# Patient Record
Sex: FEMALE | Race: BLACK OR AFRICAN AMERICAN | NOT HISPANIC OR LATINO | URBAN - METROPOLITAN AREA
[De-identification: names, ages, dates, MRNs, and addresses within clinical notes are randomized per-mention and may not be internally consistent; named-entity substitution may affect disease eponyms.]

---

## 2019-11-13 ENCOUNTER — INPATIENT (INPATIENT)
Facility: HOSPITAL | Age: 79
LOS: 7 days | Discharge: HOME | DRG: 871 | End: 2019-11-21
Attending: INTERNAL MEDICINE | Admitting: INTERNAL MEDICINE
Payer: MEDICARE

## 2019-11-13 VITALS
HEART RATE: 118 BPM | DIASTOLIC BLOOD PRESSURE: 73 MMHG | SYSTOLIC BLOOD PRESSURE: 124 MMHG | TEMPERATURE: 99 F | OXYGEN SATURATION: 99 % | RESPIRATION RATE: 18 BRPM

## 2019-11-13 DIAGNOSIS — K92.1 MELENA: ICD-10-CM

## 2019-11-13 DIAGNOSIS — K57.92 DIVERTICULITIS OF INTESTINE, PART UNSPECIFIED, WITHOUT PERFORATION OR ABSCESS WITHOUT BLEEDING: ICD-10-CM

## 2019-11-13 DIAGNOSIS — F01.50 VASCULAR DEMENTIA WITHOUT BEHAVIORAL DISTURBANCE: ICD-10-CM

## 2019-11-13 DIAGNOSIS — R63.8 OTHER SYMPTOMS AND SIGNS CONCERNING FOOD AND FLUID INTAKE: ICD-10-CM

## 2019-11-13 DIAGNOSIS — K21.9 GASTRO-ESOPHAGEAL REFLUX DISEASE WITHOUT ESOPHAGITIS: ICD-10-CM

## 2019-11-13 DIAGNOSIS — R74.0 NONSPECIFIC ELEVATION OF LEVELS OF TRANSAMINASE AND LACTIC ACID DEHYDROGENASE [LDH]: ICD-10-CM

## 2019-11-13 DIAGNOSIS — E78.5 HYPERLIPIDEMIA, UNSPECIFIED: ICD-10-CM

## 2019-11-13 DIAGNOSIS — Z91.89 OTHER SPECIFIED PERSONAL RISK FACTORS, NOT ELSEWHERE CLASSIFIED: ICD-10-CM

## 2019-11-13 DIAGNOSIS — I42.9 CARDIOMYOPATHY, UNSPECIFIED: ICD-10-CM

## 2019-11-13 DIAGNOSIS — R79.89 OTHER SPECIFIED ABNORMAL FINDINGS OF BLOOD CHEMISTRY: ICD-10-CM

## 2019-11-13 LAB
ACANTHOCYTES BLD QL SMEAR: SLIGHT — SIGNIFICANT CHANGE UP
ALBUMIN SERPL ELPH-MCNC: 3.8 G/DL — SIGNIFICANT CHANGE UP (ref 3.3–5)
ALP SERPL-CCNC: 88 U/L — SIGNIFICANT CHANGE UP (ref 40–120)
ALT FLD-CCNC: 70 U/L — HIGH (ref 10–45)
ANION GAP SERPL CALC-SCNC: 13 MMOL/L — SIGNIFICANT CHANGE UP (ref 5–17)
ANION GAP SERPL CALC-SCNC: 14 MMOL/L — SIGNIFICANT CHANGE UP (ref 5–17)
APTT BLD: 25.6 SEC — LOW (ref 27.5–36.3)
AST SERPL-CCNC: 115 U/L — HIGH (ref 10–40)
BASOPHILS # BLD AUTO: 0 K/UL — SIGNIFICANT CHANGE UP (ref 0–0.2)
BASOPHILS NFR BLD AUTO: 0 % — SIGNIFICANT CHANGE UP (ref 0–2)
BILIRUB SERPL-MCNC: 0.7 MG/DL — SIGNIFICANT CHANGE UP (ref 0.2–1.2)
BLD GP AB SCN SERPL QL: NEGATIVE — SIGNIFICANT CHANGE UP
BUN SERPL-MCNC: 22 MG/DL — SIGNIFICANT CHANGE UP (ref 7–23)
BUN SERPL-MCNC: 25 MG/DL — HIGH (ref 7–23)
BURR CELLS BLD QL SMEAR: PRESENT — SIGNIFICANT CHANGE UP
CALCIUM SERPL-MCNC: 8 MG/DL — LOW (ref 8.4–10.5)
CALCIUM SERPL-MCNC: 9.4 MG/DL — SIGNIFICANT CHANGE UP (ref 8.4–10.5)
CHLORIDE SERPL-SCNC: 100 MMOL/L — SIGNIFICANT CHANGE UP (ref 96–108)
CHLORIDE SERPL-SCNC: 104 MMOL/L — SIGNIFICANT CHANGE UP (ref 96–108)
CHOLEST SERPL-MCNC: 137 MG/DL — SIGNIFICANT CHANGE UP (ref 10–199)
CO2 SERPL-SCNC: 18 MMOL/L — LOW (ref 22–31)
CO2 SERPL-SCNC: 21 MMOL/L — LOW (ref 22–31)
CREAT SERPL-MCNC: 2.39 MG/DL — HIGH (ref 0.5–1.3)
CREAT SERPL-MCNC: 2.64 MG/DL — HIGH (ref 0.5–1.3)
EOSINOPHIL # BLD AUTO: 0 K/UL — SIGNIFICANT CHANGE UP (ref 0–0.5)
EOSINOPHIL NFR BLD AUTO: 0 % — SIGNIFICANT CHANGE UP (ref 0–6)
GIANT PLATELETS BLD QL SMEAR: PRESENT — SIGNIFICANT CHANGE UP
GLUCOSE SERPL-MCNC: 206 MG/DL — HIGH (ref 70–99)
GLUCOSE SERPL-MCNC: 300 MG/DL — HIGH (ref 70–99)
HAV IGM SER-ACNC: SIGNIFICANT CHANGE UP
HBV CORE IGM SER-ACNC: SIGNIFICANT CHANGE UP
HBV SURFACE AG SER-ACNC: SIGNIFICANT CHANGE UP
HCT VFR BLD CALC: 36.5 % — SIGNIFICANT CHANGE UP (ref 34.5–45)
HCT VFR BLD CALC: 41.8 % — SIGNIFICANT CHANGE UP (ref 34.5–45)
HCV AB S/CO SERPL IA: 0.12 S/CO — SIGNIFICANT CHANGE UP
HCV AB SERPL-IMP: SIGNIFICANT CHANGE UP
HDLC SERPL-MCNC: 67 MG/DL — SIGNIFICANT CHANGE UP
HGB BLD-MCNC: 12 G/DL — SIGNIFICANT CHANGE UP (ref 11.5–15.5)
HGB BLD-MCNC: 13.6 G/DL — SIGNIFICANT CHANGE UP (ref 11.5–15.5)
INR BLD: 1.1 — SIGNIFICANT CHANGE UP (ref 0.88–1.16)
LACTATE SERPL-SCNC: 1.8 MMOL/L — SIGNIFICANT CHANGE UP (ref 0.5–2)
LIPID PNL WITH DIRECT LDL SERPL: 49 MG/DL — SIGNIFICANT CHANGE UP
LYMPHOCYTES # BLD AUTO: 0.29 K/UL — LOW (ref 1–3.3)
LYMPHOCYTES # BLD AUTO: 2.6 % — LOW (ref 13–44)
MAGNESIUM SERPL-MCNC: 1.8 MG/DL — SIGNIFICANT CHANGE UP (ref 1.6–2.6)
MANUAL SMEAR VERIFICATION: SIGNIFICANT CHANGE UP
MCHC RBC-ENTMCNC: 28.4 PG — SIGNIFICANT CHANGE UP (ref 27–34)
MCHC RBC-ENTMCNC: 28.6 PG — SIGNIFICANT CHANGE UP (ref 27–34)
MCHC RBC-ENTMCNC: 32.5 GM/DL — SIGNIFICANT CHANGE UP (ref 32–36)
MCHC RBC-ENTMCNC: 32.9 GM/DL — SIGNIFICANT CHANGE UP (ref 32–36)
MCV RBC AUTO: 86.9 FL — SIGNIFICANT CHANGE UP (ref 80–100)
MCV RBC AUTO: 87.3 FL — SIGNIFICANT CHANGE UP (ref 80–100)
MONOCYTES # BLD AUTO: 2.04 K/UL — HIGH (ref 0–0.9)
MONOCYTES NFR BLD AUTO: 18.4 % — HIGH (ref 2–14)
NEUTROPHILS # BLD AUTO: 8.66 K/UL — HIGH (ref 1.8–7.4)
NEUTROPHILS NFR BLD AUTO: 60.5 % — SIGNIFICANT CHANGE UP (ref 43–77)
NEUTS BAND # BLD: 17.6 % — HIGH (ref 0–8)
NRBC # BLD: 0 /100 WBCS — SIGNIFICANT CHANGE UP (ref 0–0)
OVALOCYTES BLD QL SMEAR: SLIGHT — SIGNIFICANT CHANGE UP
PLAT MORPH BLD: ABNORMAL
PLATELET # BLD AUTO: 189 K/UL — SIGNIFICANT CHANGE UP (ref 150–400)
PLATELET # BLD AUTO: 224 K/UL — SIGNIFICANT CHANGE UP (ref 150–400)
POTASSIUM SERPL-MCNC: 3.1 MMOL/L — LOW (ref 3.5–5.3)
POTASSIUM SERPL-MCNC: 3.3 MMOL/L — LOW (ref 3.5–5.3)
POTASSIUM SERPL-SCNC: 3.1 MMOL/L — LOW (ref 3.5–5.3)
POTASSIUM SERPL-SCNC: 3.3 MMOL/L — LOW (ref 3.5–5.3)
PROT SERPL-MCNC: 8.2 G/DL — SIGNIFICANT CHANGE UP (ref 6–8.3)
PROTHROM AB SERPL-ACNC: 12.5 SEC — SIGNIFICANT CHANGE UP (ref 10–12.9)
RBC # BLD: 4.2 M/UL — SIGNIFICANT CHANGE UP (ref 3.8–5.2)
RBC # BLD: 4.79 M/UL — SIGNIFICANT CHANGE UP (ref 3.8–5.2)
RBC # FLD: 13.6 % — SIGNIFICANT CHANGE UP (ref 10.3–14.5)
RBC # FLD: 13.7 % — SIGNIFICANT CHANGE UP (ref 10.3–14.5)
RBC BLD AUTO: ABNORMAL
RH IG SCN BLD-IMP: POSITIVE — SIGNIFICANT CHANGE UP
RH IG SCN BLD-IMP: POSITIVE — SIGNIFICANT CHANGE UP
SALICYLATES SERPL-MCNC: <0.3 MG/DL — LOW (ref 2.8–20)
SODIUM SERPL-SCNC: 135 MMOL/L — SIGNIFICANT CHANGE UP (ref 135–145)
SODIUM SERPL-SCNC: 135 MMOL/L — SIGNIFICANT CHANGE UP (ref 135–145)
TOTAL CHOLESTEROL/HDL RATIO MEASUREMENT: 2 RATIO — LOW (ref 3.3–7.1)
TRIGL SERPL-MCNC: 107 MG/DL — SIGNIFICANT CHANGE UP (ref 10–149)
VARIANT LYMPHS # BLD: 0.9 % — SIGNIFICANT CHANGE UP (ref 0–6)
WBC # BLD: 11.09 K/UL — HIGH (ref 3.8–10.5)
WBC # BLD: 8.86 K/UL — SIGNIFICANT CHANGE UP (ref 3.8–10.5)
WBC # FLD AUTO: 11.09 K/UL — HIGH (ref 3.8–10.5)
WBC # FLD AUTO: 8.86 K/UL — SIGNIFICANT CHANGE UP (ref 3.8–10.5)

## 2019-11-13 PROCEDURE — 70498 CT ANGIOGRAPHY NECK: CPT | Mod: 26

## 2019-11-13 PROCEDURE — 99222 1ST HOSP IP/OBS MODERATE 55: CPT

## 2019-11-13 PROCEDURE — 99283 EMERGENCY DEPT VISIT LOW MDM: CPT

## 2019-11-13 PROCEDURE — 99291 CRITICAL CARE FIRST HOUR: CPT

## 2019-11-13 PROCEDURE — 70496 CT ANGIOGRAPHY HEAD: CPT | Mod: 26

## 2019-11-13 PROCEDURE — 70450 CT HEAD/BRAIN W/O DYE: CPT | Mod: 26,59

## 2019-11-13 PROCEDURE — 74176 CT ABD & PELVIS W/O CONTRAST: CPT | Mod: 26

## 2019-11-13 PROCEDURE — 93010 ELECTROCARDIOGRAM REPORT: CPT

## 2019-11-13 PROCEDURE — 0042T: CPT

## 2019-11-13 PROCEDURE — 71045 X-RAY EXAM CHEST 1 VIEW: CPT | Mod: 26

## 2019-11-13 PROCEDURE — 99223 1ST HOSP IP/OBS HIGH 75: CPT | Mod: GC

## 2019-11-13 RX ORDER — CEFTRIAXONE 500 MG/1
1000 INJECTION, POWDER, FOR SOLUTION INTRAMUSCULAR; INTRAVENOUS ONCE
Refills: 0 | Status: COMPLETED | OUTPATIENT
Start: 2019-11-13 | End: 2019-11-13

## 2019-11-13 RX ORDER — ONDANSETRON 8 MG/1
4 TABLET, FILM COATED ORAL ONCE
Refills: 0 | Status: COMPLETED | OUTPATIENT
Start: 2019-11-13 | End: 2019-11-13

## 2019-11-13 RX ORDER — IOHEXOL 300 MG/ML
30 INJECTION, SOLUTION INTRAVENOUS ONCE
Refills: 0 | Status: COMPLETED | OUTPATIENT
Start: 2019-11-13 | End: 2019-11-13

## 2019-11-13 RX ORDER — CEFTRIAXONE 500 MG/1
1000 INJECTION, POWDER, FOR SOLUTION INTRAMUSCULAR; INTRAVENOUS EVERY 24 HOURS
Refills: 0 | Status: COMPLETED | OUTPATIENT
Start: 2019-11-14 | End: 2019-11-17

## 2019-11-13 RX ORDER — ROSUVASTATIN CALCIUM 5 MG/1
1 TABLET ORAL
Qty: 0 | Refills: 0 | DISCHARGE

## 2019-11-13 RX ORDER — ENOXAPARIN SODIUM 100 MG/ML
40 INJECTION SUBCUTANEOUS EVERY 24 HOURS
Refills: 0 | Status: DISCONTINUED | OUTPATIENT
Start: 2019-11-13 | End: 2019-11-13

## 2019-11-13 RX ORDER — ATORVASTATIN CALCIUM 80 MG/1
80 TABLET, FILM COATED ORAL AT BEDTIME
Refills: 0 | Status: DISCONTINUED | OUTPATIENT
Start: 2019-11-13 | End: 2019-11-14

## 2019-11-13 RX ORDER — METRONIDAZOLE 500 MG
500 TABLET ORAL EVERY 8 HOURS
Refills: 0 | Status: DISCONTINUED | OUTPATIENT
Start: 2019-11-14 | End: 2019-11-19

## 2019-11-13 RX ORDER — TRAMADOL HYDROCHLORIDE 50 MG/1
1 TABLET ORAL
Qty: 0 | Refills: 0 | DISCHARGE

## 2019-11-13 RX ORDER — POTASSIUM CHLORIDE 20 MEQ
40 PACKET (EA) ORAL ONCE
Refills: 0 | Status: COMPLETED | OUTPATIENT
Start: 2019-11-13 | End: 2019-11-13

## 2019-11-13 RX ORDER — ASPIRIN/CALCIUM CARB/MAGNESIUM 324 MG
325 TABLET ORAL DAILY
Refills: 0 | Status: DISCONTINUED | OUTPATIENT
Start: 2019-11-13 | End: 2019-11-14

## 2019-11-13 RX ORDER — PANTOPRAZOLE SODIUM 20 MG/1
40 TABLET, DELAYED RELEASE ORAL
Refills: 0 | Status: DISCONTINUED | OUTPATIENT
Start: 2019-11-13 | End: 2019-11-21

## 2019-11-13 RX ORDER — SODIUM CHLORIDE 9 MG/ML
1000 INJECTION INTRAMUSCULAR; INTRAVENOUS; SUBCUTANEOUS ONCE
Refills: 0 | Status: COMPLETED | OUTPATIENT
Start: 2019-11-13 | End: 2019-11-13

## 2019-11-13 RX ORDER — POTASSIUM CHLORIDE 20 MEQ
10 PACKET (EA) ORAL
Refills: 0 | Status: COMPLETED | OUTPATIENT
Start: 2019-11-13 | End: 2019-11-14

## 2019-11-13 RX ORDER — OMEPRAZOLE 10 MG/1
1 CAPSULE, DELAYED RELEASE ORAL
Qty: 0 | Refills: 0 | DISCHARGE

## 2019-11-13 RX ORDER — ACETAMINOPHEN 500 MG
1 TABLET ORAL
Qty: 0 | Refills: 0 | DISCHARGE

## 2019-11-13 RX ORDER — METRONIDAZOLE 500 MG
500 TABLET ORAL ONCE
Refills: 0 | Status: COMPLETED | OUTPATIENT
Start: 2019-11-13 | End: 2019-11-13

## 2019-11-13 RX ADMIN — IOHEXOL 30 MILLILITER(S): 300 INJECTION, SOLUTION INTRAVENOUS at 17:35

## 2019-11-13 RX ADMIN — SODIUM CHLORIDE 1000 MILLILITER(S): 9 INJECTION INTRAMUSCULAR; INTRAVENOUS; SUBCUTANEOUS at 19:40

## 2019-11-13 RX ADMIN — Medication 500 MILLIGRAM(S): at 19:40

## 2019-11-13 RX ADMIN — CEFTRIAXONE 1000 MILLIGRAM(S): 500 INJECTION, POWDER, FOR SOLUTION INTRAMUSCULAR; INTRAVENOUS at 19:40

## 2019-11-13 RX ADMIN — ONDANSETRON 4 MILLIGRAM(S): 8 TABLET, FILM COATED ORAL at 17:58

## 2019-11-13 RX ADMIN — SODIUM CHLORIDE 1000 MILLILITER(S): 9 INJECTION INTRAMUSCULAR; INTRAVENOUS; SUBCUTANEOUS at 17:26

## 2019-11-13 RX ADMIN — Medication 100 MILLIGRAM(S): at 17:27

## 2019-11-13 RX ADMIN — CEFTRIAXONE 100 MILLIGRAM(S): 500 INJECTION, POWDER, FOR SOLUTION INTRAMUSCULAR; INTRAVENOUS at 17:22

## 2019-11-13 NOTE — H&P ADULT - HISTORY OF PRESENT ILLNESS
79YOF with PMH of HLD, Vascular Dementia ( A&Ox1), unspecified Cardiomyopathy, and GERD who presents for BRBPR. As per daughter on the phone, patient patient had been having constipation for a couple of days which led to straining with subsequent diarrhea w/ BRBPR which started as spotting and then to more sean bleeding (atleast 3 bouts). Pt endorsed to her daughter LLQ pain as well but denied any hemat(emesis), sick contacts, fever or chills at home, or prior episodes of BRBPR. Pt had C-scope years ago but daughter could not recall findings. In the ambulance, patient found to be altered and endorsing severe HA (worst headache of her life), therefore stroke code was called with negative imaging. GI consulted in ED s/p CTa/p with evidence of recto-sig thickening, recommending treatment for diverticulitis as well as workup for transaminitis. Pt seen at ED bedside at 9pm endorsing that HA has resolved, denies any blurry vision, new motor or sensation changes, and endorses no abd. pain at rest (just tenderness to palpation), and denies any nausea, (last BM had spotting of blood only once since admission).   At ED vitals:  aafebrile, HR 110s, -140/70s, 90--. 95% 2L NC  Labs s/f: WBC 11, Bands 17, Lact 1.8, Hb 13.6, Bun/ Cr 25/2.64, AST//117    Pt given: CFTZ/flagyl, zofran 4 IVP, 2L NS 79YOF with PMH of HLD, Vascular Dementia ( A&Ox1), unspecified Cardiomyopathy, and GERD who presents for BRBPR. As per daughter on the phone, patient patient had been having constipation for a couple of days which led to straining with subsequent diarrhea w/ BRBPR which started as spotting and then to more sean bleeding (atleast 3 bouts). Pt endorsed to her daughter LLQ pain as well but denied any hemat(emesis), sick contacts, fever or chills at home, or prior episodes of BRBPR. Pt had C-scope years ago but daughter could not recall findings. In the ambulance, patient found to be altered and endorsing severe HA (worst headache of her life), therefore stroke code was called with negative imaging. GI consulted in ED s/p CTa/p with evidence of recto-sig thickening, recommending treatment for diverticulitis as well as workup for transaminitis. Pt seen at ED bedside at 9pm endorsing that HA has resolved, denies any blurry vision, new motor or sensation changes, and endorses no abd. pain at rest (just tenderness to palpation), and denies any nausea, (last BM had spotting of blood only once since admission).   At ED vitals:  afebrile, HR 110s, -140/70s, 90--. 95% 2L NC  Labs s/f: WBC 11, Bands 17, Lact 1.8, Hb 13.6, Bun/ Cr 25/2.64, AST//117    Pt given: CFTZ/flagyl, zofran 4 IVP, 2L NS

## 2019-11-13 NOTE — H&P ADULT - PROBLEM SELECTOR PLAN 1
Pt with BRBPR possibly 2/2 diverticulosis vs hemorrhoids in setting od straining with constipation prior to diarrhea. Currently improving, only 1 BM (blood spotting) since admission. Hb stable.   - Maintain active Type and screen   - CBCqd  - Monitor BMs   - Maintain 2 large bore IVs

## 2019-11-13 NOTE — H&P ADULT - PROBLEM SELECTOR PLAN 7
PMH of cardiomyopathy as per daughter unknown etiology (does not recall if CAD) as she recently moved in with her.  - Obtain collateral from family members   - C/w ASA 325mg and follow up reasoning for this dose vs 81mg, and Rosuvastatin 20qd PMH of cardiomyopathy as per daughter unknown etiology (does not recall if CAD) as she recently moved in with her.  - Obtain collateral from family members   - Holding ASA 325mg and follow up reasoning for this dose vs 81mg, and home Rosuvastatin 20qd holding in setting of transaminitis PMH of HLD on home Rosuvastatin 20qd holding in setting of transaminitis

## 2019-11-13 NOTE — H&P ADULT - PROBLEM SELECTOR PLAN 2
Pt with hematochezia and LLQ abd pain, found to have CT a/p recto-sigmoid thickness concerning for possible diverticulitis. Lactate neg but WBC 11, Bands 17. S/p GI consult with recs for tx of diverticulitis.  - C/w CFTZ 1gqd /flagyl 500mg TID   - F/u GI recs     #Sepsis  Pt tachycardic 110s, WBC 11, Bands 17 3/4 SIRS. Appropriate fluid resuscitation 2L NS likely all 2/2 intrabd. source as above.   - F/u BCX ,UA/UCX, CXR no infiltrate   - Abx as above

## 2019-11-13 NOTE — H&P ADULT - NSICDXPASTSURGICALHX_GEN_ALL_CORE_FT
PAST SURGICAL HISTORY:  No significant past surgical history PAST SURGICAL HISTORY:  H/O: hysterectomy

## 2019-11-13 NOTE — CONSULT NOTE ADULT - ASSESSMENT
78yo F with a PMHx of HLD, dementia (reportedly A&Ox1 at baseline), unspecified cardiomyopathy, and GERD who was sent in by per PMD after evaluation for bright red blood per rectum. GI consulted for rectal bleeding.    1. Rectal bleeding - Patient reportedly with multiple episodes of BRBPR noted in her diaper, no reported overt melena or hematochezia. Story of straining with passage of stool suggestive of hemorrhoidal bleeding, however, bandemia and tachycardia meeting SIRS criteria with LLQ pain and diarrhea concerning for diverticulitis/diverticular etiology of bleeding. Pain at time of BM concerning for ischemic colitis, though lactate negative.   - Continue empiric therapy with 78yo F with a PMHx of HLD, dementia (reportedly A&Ox1 at baseline), unspecified cardiomyopathy, and GERD who was sent in by per PMD after evaluation for bright red blood per rectum. GI consulted for rectal bleeding.    1. Rectal bleeding - Patient reportedly with multiple episodes of BRBPR noted in her diaper, no reported overt melena or hematochezia. Story of straining with passage of stool suggestive of hemorrhoidal bleeding, however, bandemia and tachycardia meeting SIRS criteria with LLQ pain and diarrhea concerning for diverticulitis/diverticular etiology of bleeding. Pain at time of BM concerning for ischemic colitis, though lactate negative.   - Continue empiric therapy for presumed sepsis from abdominal source with Ceftriaxone/Flagyl  - Patient would benefit from cross sectional imaging to evaluate bowel with concern for diverticulitis/ischemic colitis, though elevated Cr (unknown baseline) and already received large contrast load for CVA w/u  - Obtain CT A/P with PO contrast  - GI PCR  - Maintain active T&S, large bore IV access  - Serial Hgb/Hct  - Transfusion threshold per primary team    2. Elevated LTs - Patient with elevated LTs on presentation, , ALT 70, normal bilirubin and alk phos suggestive of hepatocellular pattern. No reported history of liver disease, daughter denies EtOH history. Tylenol noted as home medication. No INR/PT prolongation noted on initial labs.  - Obtain outpatient labs for collateral  - RUQ sono  - Add on Acetaminophen and Salicylate levels to presenting labs  - Would consider empiric NAC until level returns  - Obtain Hepatitis A, B, C, E serologies  - Medications reviewed on [facility] Liver[employer]ox database: Acetaminophen class A, Aspirin class A, Rosuvastatin class B, Omeprazole class B, Zolpidem class E, Trazodone rare (letters corresponding to likelihood of etiology of liver injury)    Recommendations discussed with primary team  Case discussed with attending physician

## 2019-11-13 NOTE — H&P ADULT - NSHPLABSRESULTS_GEN_ALL_CORE
LABS:                         12.0   8.86  )-----------( 189      ( 13 Nov 2019 21:58 )             36.5     11-13    135  |  104  |  22  ----------------------------<  206<H>  3.1<L>   |  18<L>  |  2.39<H>    Ca    8.0<L>      13 Nov 2019 21:58    TPro  8.2  /  Alb  3.8  /  TBili  0.7  /  DBili  x   /  AST  115<H>  /  ALT  70<H>  /  AlkPhos  88  11-13    PT/INR - ( 13 Nov 2019 15:13 )   PT: 12.5 sec;   INR: 1.10          PTT - ( 13 Nov 2019 15:13 )  PTT:25.6 sec      Lactate, Blood: 1.8 mmoL/L (11-13 @ 15:58)      RADIOLOGY, EKG & ADDITIONAL TESTS: Reviewed.

## 2019-11-13 NOTE — H&P ADULT - PROBLEM SELECTOR PLAN 8
Pt with Bun/ Cr 25/2.64, unknown baseline, likely multifactorial renal disease with pre-renal component in setting of diarrhea. S/p fluid repletion.   - F/u BMPqd  - F/u Ulytes PMH of GERD on omeprazole 20qd c/w PPI here.

## 2019-11-13 NOTE — H&P ADULT - NSHPPHYSICALEXAM_GEN_ALL_CORE
.  VITAL SIGNS:  T(F): 98.7 (11-13-19 @ 14:43), Max: 98.7 (11-13-19 @ 14:43)  HR: 110 (11-13-19 @ 20:42) (110 - 118)  BP: 147/69 (11-13-19 @ 20:42) (124/73 - 161/76)  BP(mean): --  RR: 18 (11-13-19 @ 20:42) (18 - 18)  SpO2: 99% (11-13-19 @ 20:42) (90% - 99%)    PHYSICAL EXAM:  Constitutional: Obese  female in NAD  HEENT: NC/AT, PERRL,  MMM  Neck: supple  Respiratory: CTA B/L on RA   Cardiac: +S1/S2; RRR; no M/R/G  Gastrointestinal: soft,ND tenderness in LLQ +BSx4  Back: spine midline  Extremities: WWP, RLE> LLE edema   Musculoskeletal: NROM x4  Vascular: 2+  DP pulses B/LAD  Neurologic: AAOx1;  no focal deficits

## 2019-11-13 NOTE — ED CLERICAL - NS ED CLERK NOTE PRE-ARRIVAL INFORMATION; ADDITIONAL PRE-ARRIVAL INFORMATION
78 Y/O F DEMAR HERNANDEZ BEING SENT IN BY DR AUTUMN BENJAMIN FOR RECTAL BLEEDING PKEASE CALL DR BENJAMIN @ [phone]

## 2019-11-13 NOTE — H&P ADULT - PROBLEM SELECTOR PLAN 4
PMH of vascular dementia daughter unclear if CVA in the past.   - Baseline AAOx1 HCP daughter  - F/u PT in am PMH of vascular dementia daughter unclear if CVA in the past.   - Baseline AAOx1 HCP daughter  - F/u PT in am    #AMS/ Stroke R/o   Pt found to be more altered and endorsing severe HA worst in her life s/p stroke code and CT imaging negative for an acute process. Patient likely altered secondary to baseline dementia vs sepsis.   - F/u neurology recs  - Currently HA resolved as per patient but as per neurology note if continues, to have severe HA, consider LP to r/o SAH

## 2019-11-13 NOTE — H&P ADULT - NSICDXPASTMEDICALHX_GEN_ALL_CORE_FT
PAST MEDICAL HISTORY:  Cardiomyopathy     Dementia     GERD (gastroesophageal reflux disease)     HLD (hyperlipidemia)

## 2019-11-13 NOTE — ED PROVIDER NOTE - CLINICAL SUMMARY MEDICAL DECISION MAKING FREE TEXT BOX
80 y/o F with PMHx of HLD, dementia, cardiomyopathy and GERD presenting with AMS and abdominal pain with associated GI bleed. Code stroke called. No focal findings on imaging. Labs reviewed, with Hbg 13.6, lactate 1.8, creatinine 2.64. Glucose is 300 despite no prior hx of DM. GI team called; will CT abdomen and pelvis with PO contrast given GI bleed and abdominal pain. 78 y/o F with PMHx of HLD, dementia, cardiomyopathy and GERD presenting with AMS and abdominal pain with associated GI bleed. Code stroke called. No focal findings on imaging. Labs reviewed, with Hbg 13.6, lactate 1.8, creatinine 2.64. Glucose is 300 despite no prior hx of DM. GI team called; will CT abdomen and pelvis with PO contrast given GI bleed and abdominal pain.  Signed out to Dr. Perdomo pending ct, gi evaluation and further mgmt.  As per Dr. Hughes would like to admit to hospitalist.  If surgery needed, service. 80 y/o F with PMHx of HLD, dementia, cardiomyopathy and GERD presenting with AMS and abdominal pain with associated GI bleed. Code stroke called. No focal findings on imaging. Labs reviewed, with Hbg 13.6, lactate 1.8, creatinine 2.64. Glucose is 300 despite no prior hx of DM. GI team called; will CT abdomen and pelvis with PO contrast given GI bleed and abdominal pain.  Signed out to Dr. Perdomo pending ct, gi evaluation and further mgmt.  As per Dr. Hughes would like to admit to hospitalist.  If surgery needed, service.    cell phone of daughter who is the HCP, POA and makes all decisions - [phone], she can also be reached at the house number for the patient in the chart

## 2019-11-13 NOTE — H&P ADULT - ASSESSMENT
79YOF with PMH of HLD, Vascular Dementia ( A&Ox1), unspecified Cardiomyopathy, and GERD who presents for BRBPR, admitted for hem 79YOF with PMH of HLD, Vascular Dementia ( A&Ox1), unspecified Cardiomyopathy, and GERD who presents for BRBPR, admitted for hematochezia and diverticulitis.

## 2019-11-13 NOTE — CONSULT NOTE ADULT - SUBJECTIVE AND OBJECTIVE BOX
NEUROLOGY INITIAL CONSULT NOTE    CHIEF COMPLAINT:  Worst headache of my life     HPI:  80yo F PMhx dementia (baseline awake, alert x 1 (name)), abdominal hysterectomy (1988) presented to ED for eval of worst headache of life. Pt stated she was in her usual state of marc until 7:30AM when she complained of a throbbing, pounding 8/10 headache without any palliative factors. Codeine has note helped her headaches. Pt reports that closing her eyes makes the headache worse. Her daughters stated she was confused, lethargic and had slurred speech. The family called PCP Dr. Hughes and he evaluated patient at home then called an ambulance. While in ambulance, pt reported symptoms improved. However upon arrival to ED, patient reported worsening headache, stroke code activated. NHISS 1. Of note, pt has been complaining of a headache in the past 4 weeks and supposed to see neurologist Dr. Alona Mckay today. Additionally, pt family members reports some BRBPR in past 2 days as well as a dark tarry stool this morning. Pt denies any weakness, chest pain, palpitations, vision changes, fevers, chills, nausea, vomiting, fevers.     ED vitals: 124/73 * RR 18 98.7 oral temp 99% RA. CT head: no acute infarct or hemorrhage with chronic microvascular changes. CT H perfusion negative and CT angio H/neck showed no significant stenosis.         PAST MEDICAL & SURGICAL HISTORY:  Mastectomy 1987 1988 Total abdominal hysterectomy  2002 Right total knee replacement  2002 Cervical fusion spinvical  2005 Left knee orthoscopy        REVIEW OF SYSTEMS:  As per HPI, otherwise negative for Constitutional, Eyes, Ears/Nose/Mouth/Throat, Neck, Cardiovascular, Respiratory, Gastrointestinal, Genitourinary, Skin, Endocrine, Musculoskeletal, Psychiatric, and Hematologic/Lymphatic.    MEDICATIONS  (STANDING):    MEDICATIONS  (PRN):      Allergies    codeine (Other)  Seafood (Other)  Sular (Other)    Intolerances        FAMILY HISTORY:      SOCIAL HISTORY:  Living Situation:  Occupation:  Tobacco:  Alcohol:   Drug use:      VITAL SIGNS:  Vital Signs Last 24 Hrs  T(C): 37.1 (13 Nov 2019 14:43), Max: 37.1 (13 Nov 2019 14:43)  T(F): 98.7 (13 Nov 2019 14:43), Max: 98.7 (13 Nov 2019 14:43)  HR: 113 (13 Nov 2019 15:33) (113 - 118)  BP: 141/72 (13 Nov 2019 15:33) (124/73 - 141/72)  BP(mean): --  RR: 18 (13 Nov 2019 14:43) (18 - 18)  SpO2: 99% (13 Nov 2019 14:43) (99% - 99%)    PHYSICAL EXAMINATION:  Constitutional: NAD, comfortable, responds to commands    - Mental Status:  AAOx3; speech is fluent with intact naming, repetition, and comprehension  - Cranial Nerves II-XII:    II:  Visual acuity is 20/20 bilaterally; Visual fields are full to confrontation; Pupils are equal, round, and reactive to light.  III, IV, VI:  Extraocular movements are intact without nystagmus.  V:  Facial sensation is intact in the V1-V3 distribution bilaterally.  VII:  Face is symmetric with normal eye closure and smile  VIII:  Hearing is intact to finger rub.  IX, X:  Uvula is midline and soft palate rises symmetrically  XI:  Head turning and shoulder shrug are intact.  XII:  Tongue protrudes in the midline.    - Motor:  Strength is 4/5 throughout.  There is no pronator drift.  Normal muscle bulk and tone throughout.  - Reflexes:  2+ and symmetric at the biceps, triceps, brachioradialis, knees, and ankles.  Plantar responses flexor.  - Sensory:  Intact to light touch, pin prick, vibration, and joint-position sense throughout.  - Coordination:  Finger-nose-finger without dysmetria.  Rapid alternating hand movements intact.  - Gait:   Deferred    LABS:                        13.6   11.09 )-----------( 224      ( 13 Nov 2019 15:13 )             41.8     11-13    135  |  100  |  25<H>  ----------------------------<  300<H>  3.3<L>   |  21<L>  |  2.64<H>    Ca    9.4      13 Nov 2019 15:13    TPro  8.2  /  Alb  3.8  /  TBili  0.7  /  DBili  x   /  AST  115<H>  /  ALT  70<H>  /  AlkPhos  88  11-13    PT/INR - ( 13 Nov 2019 15:13 )   PT: 12.5 sec;   INR: 1.10          PTT - ( 13 Nov 2019 15:13 )  PTT:25.6 sec      RADIOLOGY & ADDITIONAL STUDIES:      IMPRESSION & RECOMMENDATIONS: NEUROLOGY INITIAL CONSULT NOTE    CHIEF COMPLAINT:  Worst headache of my life     HPI:  78yo F PMhx dementia (baseline AAOx1 (name)), abdominal hysterectomy (1988) presented to ED for eval of worst headache of life. Pt stated she was in her usual state of marc until 7:30AM when she complained of a throbbing, pounding 8/10 headache without any palliative factors. Codeine has note helped her headaches. Pt reports that looking at light makes the headache worse. Her daughters stated she was confused, lethargic and had slurred speech. The family called PCP Dr. Hughes and he evaluated patient at home then called an ambulance. While in ambulance, pt reported symptoms improved. However upon arrival to ED, patient reported worsening headache, stroke code activated. NHISS 1. Of note, pt has been complaining of a headache in the past 4 weeks and supposed to see neurologist Dr. Alona Mckay today. Additionally, pt family members reports some BRBPR in past 2 days as well as a dark tarry stool this morning. Pt denies any weakness, chest pain, palpitations, vision changes, fevers, chills, nausea, vomiting, fevers.     ED vitals: 124/73 * RR 18 98.7 oral temp 99% RA. CT head: no acute infarct or hemorrhage with chronic microvascular changes. CT H perfusion negative and CT angio H/neck showed no significant stenosis.     Medications:   Senna 8.5mg 2x daily  Acetaminophen 325mg prn  rosuvastain 40mg qd  spirin 325mg qd  zolpidem 5mg at bedtime  trazadone 100mg at bedtime  omeprazole 20mg once daily     PAST MEDICAL & SURGICAL HISTORY:  Mastectomy 1987 1988 Total abdominal hysterectomy  2002 Right total knee replacement  2002 Cervical fusion spinvical  2005 Left knee orthoscopy        REVIEW OF SYSTEMS:  As per HPI, otherwise negative for Constitutional, Eyes, Ears/Nose/Mouth/Throat, Neck, Cardiovascular, Respiratory, Gastrointestinal, Genitourinary, Skin, Endocrine, Musculoskeletal, Psychiatric, and Hematologic/Lymphatic.    MEDICATIONS  (STANDING):    MEDICATIONS  (PRN):      Allergies    codeine (Other)  Seafood (Other)  Sular (Other)    Intolerances        FAMILY HISTORY:      SOCIAL HISTORY:  Living Situation:  Occupation:  Tobacco:  Alcohol:   Drug use:      VITAL SIGNS:  Vital Signs Last 24 Hrs  T(C): 37.1 (13 Nov 2019 14:43), Max: 37.1 (13 Nov 2019 14:43)  T(F): 98.7 (13 Nov 2019 14:43), Max: 98.7 (13 Nov 2019 14:43)  HR: 113 (13 Nov 2019 15:33) (113 - 118)  BP: 141/72 (13 Nov 2019 15:33) (124/73 - 141/72)  BP(mean): --  RR: 18 (13 Nov 2019 14:43) (18 - 18)  SpO2: 99% (13 Nov 2019 14:43) (99% - 99%)    PHYSICAL EXAMINATION:  Constitutional: NAD, comfortable, responds to commands    - Mental Status:  AAOx3; speech is fluent with intact naming, repetition, and comprehension  - Cranial Nerves II-XII:    II:  Visual acuity is 20/20 bilaterally; Visual fields are full to confrontation; Pupils are equal, round, and reactive to light.  III, IV, VI:  Extraocular movements are intact without nystagmus.  V:  Facial sensation is intact in the V1-V3 distribution bilaterally.  VII:  Face is symmetric with normal eye closure and smile  VIII:  Hearing is intact to finger rub.  IX, X:  Uvula is midline and soft palate rises symmetrically  XI:  Head turning and shoulder shrug are intact.  XII:  Tongue protrudes in the midline.    - Motor:  Strength is 4/5 throughout.  There is no pronator drift.  Normal muscle bulk and tone throughout.  - Reflexes:  2+ and symmetric at the biceps, triceps, brachioradialis, knees, and ankles.  Plantar responses flexor.  - Sensory:  Intact to light touch, pin prick, vibration, and joint-position sense throughout.  - Coordination:  Finger-nose-finger without dysmetria.  Rapid alternating hand movements intact.  - Gait:   Deferred  - Back: noted with spinal tenderness from upper thoracic to lower lumbar spine    LABS:                        13.6   11.09 )-----------( 224      ( 13 Nov 2019 15:13 )             41.8     11-13    135  |  100  |  25<H>  ----------------------------<  300<H>  3.3<L>   |  21<L>  |  2.64<H>    Ca    9.4      13 Nov 2019 15:13    TPro  8.2  /  Alb  3.8  /  TBili  0.7  /  DBili  x   /  AST  115<H>  /  ALT  70<H>  /  AlkPhos  88  11-13    PT/INR - ( 13 Nov 2019 15:13 )   PT: 12.5 sec;   INR: 1.10          PTT - ( 13 Nov 2019 15:13 )  PTT:25.6 sec      RADIOLOGY & ADDITIONAL STUDIES:      IMPRESSION & RECOMMENDATIONS:

## 2019-11-13 NOTE — H&P ADULT - PROBLEM SELECTOR PLAN 5
PMH of HLD on home Rosuvastatin 20qd c/w interchange lipitor 80qdd PMH of HLD on home Rosuvastatin 20qd holding in setting of transaminitis Pt with Bun/ Cr 25/2.64, unknown baseline, likely multifactorial renal disease with pre-renal component in setting of diarrhea. S/p fluid repletion.   - F/u BMPqd  - F/u Ulytes Pt with Bun/ Cr 25/2.64, unknown baseline, likely multifactorial renal disease with pre-renal component in setting of diarrhea. S/p fluid repletion.   - F/u BMPqd  - F/u Ulytes    #Hyperglycemia   Pt with Glucose 300s in the afternoon while NPO, with no prior hx of DM2.  - F/u A1C   - mISS started

## 2019-11-13 NOTE — ED ADULT TRIAGE NOTE - CHIEF COMPLAINT QUOTE
Patient biba for AMS. Last known normal 9pm last night. EMS noted slurred speech en route. Patient is currently aaox2, follows commands appropriates. pmh dementia.

## 2019-11-13 NOTE — H&P ADULT - ATTENDING COMMENTS
patient seen and examined   reviewed pertinent data, h&p, w/ edits   pertinent pe findings    a/p:   1. hematochezia/ diveticulitis/ sepsis: on ceftriaxone and flagyl   2. monitor LFTs  3. followup lower ext doppler r/o DvT  4. monitor for recurring HAs  5. obtain baseline cr, monitor BMP     rest of plan as above   medical decision making : high complexity patient seen and examined   reviewed pertinent data, h&p, w/ edits   pertinent pe findings: elderly female, in NAD, unable to recall events ; +LLQ pain on palpation; pt w/ RLExt larger than left; pt unable to cooperate to extend legs to measure motor strength; rest of exam as above     a/p:   1. hematochezia/ diveticulitis/ sepsis: on ceftriaxone and flagyl ; holding ASA 325mg, need to clarify why on this dose, holding in setting of hemotochezia   2. monitor LFTs, holding statin   3. followup lower ext doppler r/o DvT  4. monitor for recurring HAs  5. obtain baseline cr, monitor BMP       rest of plan as above   medical decision making : high complexity

## 2019-11-13 NOTE — ED ADULT NURSE NOTE - OBJECTIVE STATEMENT
Pt BIBA. Stroke code called at 14:41. Per ambulance reports "Pt was well at 9pm yesterday and woke at 7:30 this morning with inability to talk and was confused per the family until 11:30am . Upon EMS arrival to patients house, symptoms resolved". Upon transport to ED "Pt developing slurred speech and complaining of worsening headache x 4 weeks". Pt alert and oriented x2 upon arrival. Pt also endorses rectal bleeding (2 episodes) and intermittent abdominal pain. Pt is not on blood thinners. PMH dementia.

## 2019-11-13 NOTE — H&P ADULT - PROBLEM SELECTOR PLAN 3
Pt with AST//117 on admission with unknown baseline. Possibly 2/2 chronic tylenol use (currently holding) vs hepatitis (no cholestatic picture) ve less likely 2/2 sepsis.   - hepatitis panel   - salicylate/ acet level nL   - f/u CMP qd Pt with AST//117 on admission with unknown baseline. Possibly 2/2 chronic tylenol use (currently holding) vs hepatitis (no cholestatic picture) ve less likely 2/2 sepsis.   - hepatitis panel   - salicylate/ acet level nL   - f/u CMP qd    #asymmetrical RLE edema  Endorses present for a while (no exact time) denies tenderness.   - F/u RLE doppler for r/o dvt Pt with AST//117 on admission with unknown baseline. Possibly 2/2 chronic tylenol use (currently holding) vs hepatitis (no cholestatic picture) ve less likely 2/2 sepsis.   - hepatitis panel   - salicylate/ acet level nL   - f/u CMP qd  - F/u RUQ u/s     #asymmetrical RLE edema  Endorses present for a while (no exact time) denies tenderness.   - F/u RLE doppler for r/o dvt Pt with AST//117 on admission with unknown baseline. Possibly 2/2 chronic tylenol use (currently holding) vs NAFLD, vs  hepatitis (no cholestatic picture) vs 2/2 sepsis.   - hepatitis panel   - salicylate/ acet level nL   - f/u CMP qd  - F/u RUQ u/s     #asymmetrical RLE edema  Endorses present for a while (no exact time) denies tenderness.   - F/u RLE doppler for r/o dvt

## 2019-11-13 NOTE — CONSULT NOTE ADULT - SUBJECTIVE AND OBJECTIVE BOX
GASTROENTEROLOGY CONSULT NOTE  Patient is an 80yo F with a PMHx of HLD, dementia (reportedly A&Ox1 at baseline), unspecified cardiomyopathy, and GERD who was sent in by per PMD after evaluation for bright red blood per rectum. Per the daughter at bedside, the patient has been constipated for days which led to a lot of straining. The patient then had a large BM 3 days ago with subsequent diarrhea and then the development of bright red blood per rectum. Initially spotting in her adult diaper, and later with more bright red blood. Patient endorsing LLQ pain, non-radiating, without rebound or guarding. Patient denies nausea or vomiting, endorses passage of flatus and stool. Patient does report pain at the time of her diarrhea. Of note, patient also noted to have become "unintelligible" during the ambulance ride to the hospital at which time a stroke code was called. CT, CTA, and CT perfusion studies all negative for evidence of acute CVA. Patient's mental status now reportedly back at baseline. Patient takes Aspirin but denies any other blood thinners, no reported NSAID use. Has had a colonoscopy many years ago, but does not recall the date or findings.    Allergies    codeine (Other)  Seafood (Other)  Sular (Other)    Intolerances      Home Medications:    MEDICATIONS:  MEDICATIONS  (STANDING):    MEDICATIONS  (PRN):    PAST MEDICAL & SURGICAL HISTORY:  GERD (gastroesophageal reflux disease)  Cardiomyopathy  Dementia  HLD (hyperlipidemia)  No significant past surgical history    FAMILY HISTORY:    SOCIAL HISTORY:  Tobacco:   Alcohol:  Illicit Drugs:    REVIEW OF SYSTEMS:  CONSTITUTIONAL: No weakness, fevers or chills  HEENT: No visual changes; No vertigo or throat pain   NECK: No pain or stiffness  RESPIRATORY: No cough, wheezing, hemoptysis; No shortness of breath  CARDIOVASCULAR: No chest pain or palpitations  GASTROINTESTINAL: As above  GENITOURINARY: No dysuria, frequency or hematuria  NEUROLOGICAL: No numbness or weakness  SKIN: No itching, burning, rashes, or lesions   All other 10 review of systems is negative unless indicated above.    Vital Signs Last 24 Hrs  T(C): 37.1 (13 Nov 2019 14:43), Max: 37.1 (13 Nov 2019 14:43)  T(F): 98.7 (13 Nov 2019 14:43), Max: 98.7 (13 Nov 2019 14:43)  HR: 114 (13 Nov 2019 17:00) (113 - 118)  BP: 145/66 (13 Nov 2019 17:00) (124/73 - 161/76)  BP(mean): --  RR: 18 (13 Nov 2019 17:00) (18 - 18)  SpO2: 94% (13 Nov 2019 17:00) (94% - 99%)      PHYSICAL EXAM:    General: Elderly, in no acute distress  Eyes: Anicteric sclerae, moist conjunctivae  HENT: Moist mucous membranes  Neck: Trachea midline, supple  Lungs: Normal respiratory effort, no intercostal retractions  Cardiovascular: RRR  Abdomen: Non-radiating point tenderness in the LLQ without rebound or guarding  Rectal: No stool, pink tinge on glove, no overt blood  Extremities: Normal range of motion, No clubbing, cyanosis or edema  Neurological: Alert and oriented x2  Skin: Warm and dry. No obvious rash    LABS:                        13.6   11.09 )-----------( 224      ( 13 Nov 2019 15:13 )             41.8     11-13    135  |  100  |  25<H>  ----------------------------<  300<H>  3.3<L>   |  21<L>  |  2.64<H>    Ca    9.4      13 Nov 2019 15:13    TPro  8.2  /  Alb  3.8  /  TBili  0.7  /  DBili  x   /  AST  115<H>  /  ALT  70<H>  /  AlkPhos  88  11-13        PT/INR - ( 13 Nov 2019 15:13 )   PT: 12.5 sec;   INR: 1.10          PTT - ( 13 Nov 2019 15:13 )  PTT:25.6 sec    RADIOLOGY & ADDITIONAL STUDIES:     Reviewed

## 2019-11-13 NOTE — CONSULT NOTE ADULT - ASSESSMENT
80yo F PMhx dementia (baseline awake, alert x 1 (name)), abdominal hysterectomy (1988) presented for eval of acute on subacute throbbing, pulsating headache, stroke called, NHISS 1    #Stroke (R/O)  Pt presenting with symptoms of worst headache of life today but has been having persistent headache for 4 weeks. NHISS 1, CTH and CT perfusion and CT head/neck negative for any acute hemorrhage or infarct. Possible patient may be having tension headaches vs migraines.  -consider holding aspirin in setting of GI bleed and avoid NSAIDs for headache control   -No need for MRI head at this time unless neuro status worsens  -possible current GI bleed my be contributing to current headaches  2/2 to low cerebral perfusion  -GI consult 80yo F PMhx dementia (baseline awake, alert x 1 (name)), abdominal hysterectomy (1988) presented for eval of acute on subacute throbbing, pulsating headache, stroke called, NHISS 1    #Stroke (R/O)  Pt presenting with symptoms of worst headache of life today but has been having persistent headache for 4 weeks. NHISS 1, CTH and CT perfusion and CT head/neck negative for any acute hemorrhage or infarct. Possible patient may be having SAH vs tension headaches vs migraines    - Although CT head showing no signs of subarachnoid hemorrhage, physical exam notable for back tenderness. Therefore a lumbar puncture may be needed to rule out SAH.   -consider holding aspirin in setting of GI bleed and avoid NSAIDs for headache control and possible LP  -No need for MRI head at this time unless neuro status worsens  -possible current GI bleed my be contributing to current headaches  2/2 to low cerebral perfusion  -GI consult 80yo F PMhx dementia (baseline awake, alert x 1 (name)), abdominal hysterectomy (1988) presented for eval of acute on subacute throbbing, pulsating headache, stroke called, NHISS 1    #Stroke (R/O)  Pt presenting with symptoms of worst headache of life today but has been having persistent headache for 4 weeks. NHISS 1, CTH and CT perfusion and CT head/neck negative for any acute hemorrhage or infarct. Possible patient may be having SAH vs tension headaches vs migraines    - Although CT head showing no signs of subarachnoid hemorrhage, physical exam notable for back tenderness. If headaches persist, a lumbar puncture may be indicated to rule out SAH.   -consider holding aspirin in setting of GI bleed and avoid NSAIDs for headache control and possible LP  -No need for MRI head at this time unless neuro status worsens  -possible current GI bleed my be contributing to current headaches  2/2 to low cerebral perfusion  -Recommend GI consult

## 2019-11-13 NOTE — ED PROVIDER NOTE - OBJECTIVE STATEMENT
78 y/o F with PMHx of HLD, dementia, cardiomyopathy and GERD presenting with multiple medical complaints. Pt has been noted to have abdominal pain with 3 day hx of diarrhea, now with new onset of bright red blood in stool x 3 episodes today. Pt's daughter scribes it as a large amount of bright red blood, necessitating pt's Pampers to be changed twice. Pt has no prior hx of GI bleed; last colonoscopy unknown. Denies any hematemesis, vomiting, fevers or chills. Additionally, pt was been noted to be possibly altered with slurred speech beginning at 7AM this morning. However, daughter is unclear if this is related to her dementia. Lastly, pt has complaints of a headache x 4 weeks, for which she was supposed to see a neurologist prior to today's complications. Currently in the ER, pt states that she is feeling okay, with complaints of a mild 3/10 headache and abdominal discomfort. Daughter states that pt is now back to her mental baseline.

## 2019-11-13 NOTE — ED ADULT NURSE NOTE - CHPI ED NUR SYMPTOMS NEG
no fever/no weakness/no dizziness/no blurred vision/no loss of consciousness/no nausea/no numbness/no vomiting

## 2019-11-13 NOTE — ED PROVIDER NOTE - CRITICAL CARE PROVIDED
consultation with other physicians/interpretation of diagnostic studies/direct patient care (not related to procedure)/documentation/additional history taking

## 2019-11-13 NOTE — ED ADULT NURSE NOTE - NSIMPLEMENTINTERV_GEN_ALL_ED
Implemented All Fall Risk Interventions:  [place] to call system. Call bell, personal items and telephone within reach. Instruct patient to call for assistance. Room bathroom lighting operational. Non-slip footwear when patient is off stretcher. Physically safe environment: no spills, clutter or unnecessary equipment. Stretcher in lowest position, wheels locked, appropriate side rails in place. Provide visual cue, wrist band, yellow gown, etc. Monitor gait and stability. Monitor for mental status changes and reorient to person, place, and time. Review medications for side effects contributing to fall risk. Reinforce activity limits and safety measures with patient and family.

## 2019-11-13 NOTE — H&P ADULT - PROBLEM SELECTOR PLAN 9
F: None   E: Replete for K<4 Mag<2  N: NPO   A: None now in setting of GIB, SCDs  FULL CODE  Dispo:  [facility]

## 2019-11-13 NOTE — ED PROVIDER NOTE - CARE PLAN
Principal Discharge DX:	Abdominal pain  Secondary Diagnosis:	Diarrhea  Secondary Diagnosis:	Rectal bleed

## 2019-11-14 DIAGNOSIS — K52.9 NONINFECTIVE GASTROENTERITIS AND COLITIS, UNSPECIFIED: ICD-10-CM

## 2019-11-14 DIAGNOSIS — Z90.710 ACQUIRED ABSENCE OF BOTH CERVIX AND UTERUS: Chronic | ICD-10-CM

## 2019-11-14 DIAGNOSIS — N28.9 DISORDER OF KIDNEY AND URETER, UNSPECIFIED: ICD-10-CM

## 2019-11-14 LAB
ALBUMIN SERPL ELPH-MCNC: 3.1 G/DL — LOW (ref 3.3–5)
ALP SERPL-CCNC: 76 U/L — SIGNIFICANT CHANGE UP (ref 40–120)
ALT FLD-CCNC: 87 U/L — HIGH (ref 10–45)
ANION GAP SERPL CALC-SCNC: 13 MMOL/L — SIGNIFICANT CHANGE UP (ref 5–17)
APPEARANCE UR: CLEAR — SIGNIFICANT CHANGE UP
AST SERPL-CCNC: 139 U/L — HIGH (ref 10–40)
BILIRUB SERPL-MCNC: 0.5 MG/DL — SIGNIFICANT CHANGE UP (ref 0.2–1.2)
BILIRUB UR-MCNC: NEGATIVE — SIGNIFICANT CHANGE UP
BUN SERPL-MCNC: 21 MG/DL — SIGNIFICANT CHANGE UP (ref 7–23)
CALCIUM SERPL-MCNC: 8.4 MG/DL — SIGNIFICANT CHANGE UP (ref 8.4–10.5)
CHLORIDE SERPL-SCNC: 104 MMOL/L — SIGNIFICANT CHANGE UP (ref 96–108)
CO2 SERPL-SCNC: 18 MMOL/L — LOW (ref 22–31)
COLOR SPEC: YELLOW — SIGNIFICANT CHANGE UP
CREAT ?TM UR-MCNC: 56 MG/DL — SIGNIFICANT CHANGE UP
CREAT SERPL-MCNC: 2.39 MG/DL — HIGH (ref 0.5–1.3)
DIFF PNL FLD: ABNORMAL
GLUCOSE BLDC GLUCOMTR-MCNC: 100 MG/DL — HIGH (ref 70–99)
GLUCOSE BLDC GLUCOMTR-MCNC: 113 MG/DL — HIGH (ref 70–99)
GLUCOSE BLDC GLUCOMTR-MCNC: 152 MG/DL — HIGH (ref 70–99)
GLUCOSE BLDC GLUCOMTR-MCNC: 186 MG/DL — HIGH (ref 70–99)
GLUCOSE SERPL-MCNC: 197 MG/DL — HIGH (ref 70–99)
GLUCOSE UR QL: 250
HBA1C BLD-MCNC: 6.7 % — HIGH (ref 4–5.6)
HCT VFR BLD CALC: 37.8 % — SIGNIFICANT CHANGE UP (ref 34.5–45)
HGB BLD-MCNC: 12.2 G/DL — SIGNIFICANT CHANGE UP (ref 11.5–15.5)
KETONES UR-MCNC: NEGATIVE — SIGNIFICANT CHANGE UP
LEUKOCYTE ESTERASE UR-ACNC: NEGATIVE — SIGNIFICANT CHANGE UP
LYMPHOCYTES # BLD AUTO: 17 % — SIGNIFICANT CHANGE UP (ref 13–44)
MANUAL SMEAR VERIFICATION: SIGNIFICANT CHANGE UP
MCHC RBC-ENTMCNC: 28.8 PG — SIGNIFICANT CHANGE UP (ref 27–34)
MCHC RBC-ENTMCNC: 32.3 GM/DL — SIGNIFICANT CHANGE UP (ref 32–36)
MCV RBC AUTO: 89.2 FL — SIGNIFICANT CHANGE UP (ref 80–100)
MONOCYTES NFR BLD AUTO: 12 % — SIGNIFICANT CHANGE UP (ref 2–14)
NEUTROPHILS NFR BLD AUTO: 62 % — SIGNIFICANT CHANGE UP (ref 43–77)
NEUTS BAND # BLD: 9 % — SIGNIFICANT CHANGE UP
NITRITE UR-MCNC: NEGATIVE — SIGNIFICANT CHANGE UP
NRBC # BLD: 0 /100 WBCS — SIGNIFICANT CHANGE UP (ref 0–0)
OVALOCYTES BLD QL SMEAR: SLIGHT — SIGNIFICANT CHANGE UP
PH UR: 6 — SIGNIFICANT CHANGE UP (ref 5–8)
PLAT MORPH BLD: ABNORMAL
PLATELET # BLD AUTO: 192 K/UL — SIGNIFICANT CHANGE UP (ref 150–400)
POTASSIUM SERPL-MCNC: 3.8 MMOL/L — SIGNIFICANT CHANGE UP (ref 3.5–5.3)
POTASSIUM SERPL-SCNC: 3.8 MMOL/L — SIGNIFICANT CHANGE UP (ref 3.5–5.3)
PROT SERPL-MCNC: 6.9 G/DL — SIGNIFICANT CHANGE UP (ref 6–8.3)
PROT UR-MCNC: 100 MG/DL
RBC # BLD: 4.24 M/UL — SIGNIFICANT CHANGE UP (ref 3.8–5.2)
RBC # FLD: 13.7 % — SIGNIFICANT CHANGE UP (ref 10.3–14.5)
RBC BLD AUTO: ABNORMAL
SODIUM SERPL-SCNC: 135 MMOL/L — SIGNIFICANT CHANGE UP (ref 135–145)
SODIUM UR-SCNC: 57 MMOL/L — SIGNIFICANT CHANGE UP
SP GR SPEC: 1.02 — SIGNIFICANT CHANGE UP (ref 1–1.03)
UROBILINOGEN FLD QL: 0.2 E.U./DL — SIGNIFICANT CHANGE UP
WBC # BLD: 8.64 K/UL — SIGNIFICANT CHANGE UP (ref 3.8–10.5)
WBC # FLD AUTO: 8.64 K/UL — SIGNIFICANT CHANGE UP (ref 3.8–10.5)

## 2019-11-14 PROCEDURE — 99232 SBSQ HOSP IP/OBS MODERATE 35: CPT

## 2019-11-14 PROCEDURE — 99233 SBSQ HOSP IP/OBS HIGH 50: CPT

## 2019-11-14 RX ORDER — MAGNESIUM SULFATE 500 MG/ML
2 VIAL (ML) INJECTION ONCE
Refills: 0 | Status: COMPLETED | OUTPATIENT
Start: 2019-11-13 | End: 2019-11-13

## 2019-11-14 RX ORDER — INSULIN LISPRO 100/ML
VIAL (ML) SUBCUTANEOUS
Refills: 0 | Status: DISCONTINUED | OUTPATIENT
Start: 2019-11-14 | End: 2019-11-21

## 2019-11-14 RX ADMIN — Medication 40 MILLIEQUIVALENT(S): at 00:01

## 2019-11-14 RX ADMIN — Medication 2: at 18:08

## 2019-11-14 RX ADMIN — Medication 100 MILLIEQUIVALENT(S): at 01:11

## 2019-11-14 RX ADMIN — Medication 100 MILLIGRAM(S): at 19:03

## 2019-11-14 RX ADMIN — Medication 100 MILLIGRAM(S): at 10:06

## 2019-11-14 RX ADMIN — Medication 2: at 08:52

## 2019-11-14 RX ADMIN — Medication 50 GRAM(S): at 01:10

## 2019-11-14 RX ADMIN — Medication 100 MILLIEQUIVALENT(S): at 02:27

## 2019-11-14 RX ADMIN — Medication 100 MILLIEQUIVALENT(S): at 00:01

## 2019-11-14 RX ADMIN — PANTOPRAZOLE SODIUM 40 MILLIGRAM(S): 20 TABLET, DELAYED RELEASE ORAL at 06:27

## 2019-11-14 RX ADMIN — CEFTRIAXONE 100 MILLIGRAM(S): 500 INJECTION, POWDER, FOR SOLUTION INTRAMUSCULAR; INTRAVENOUS at 18:08

## 2019-11-14 RX ADMIN — Medication 100 MILLIGRAM(S): at 02:27

## 2019-11-14 RX ADMIN — ATORVASTATIN CALCIUM 80 MILLIGRAM(S): 80 TABLET, FILM COATED ORAL at 00:01

## 2019-11-14 NOTE — PROGRESS NOTE ADULT - PROBLEM SELECTOR PLAN 1
Pt with BRBPR possibly 2/2 diverticulosis vs hemorrhoids in setting od straining with constipation prior to diarrhea. Currently improving, only 1 BM (blood spotting) since admission. Hb stable.   - Maintain active Type and screen   - CBCqd  - Monitor BMs   - Maintain 2 large bore IVs Pt with BRBPR possibly 2/2 diverticulosis vs hemorrhoids in setting of straining with constipation prior to diarrhea. Currently improving, only 1 BM (blood spotting) since admission. Hb stable.   - Maintain active Type and screen   - CBCqd  - Monitor BMs   - Maintain 2 large bore IVs  - f/u GI/PCR Pt with BRBPR possibly 2/2 diverticulosis vs hemorrhoids in setting of straining with constipation prior to diarrhea. Currently improving, only 1 BM (blood spotting) since admission. Hb stable.   - This AM, she was examined to have 2 episodes of blood/mucus clots from her vagina. A fistula can be the likely source of the bleeding. Transvaginal ultrasound ordered and OB service consulted for recs,  - Maintain active Type and screen   - CBCqd  - Monitor BMs   - Maintain 2 large bore IVs  - f/u GI/PCR Sepsis 2/2 to colitis: On admission, pt tachycardic 110s, WBC 11, Bands 17 3/4 SIRS. Appropriate fluid resuscitation 2L  Pt with hematochezia and LLQ abd pain, found to have CT a/p recto-sigmoid thickness concerning for colitis. Lactate neg but WBC 11, Bands 17, resolved today   - f/u GI recs  - C/w CFTZ 1 Gm qd /flagyl 500mg TID

## 2019-11-14 NOTE — CONSULT NOTE ADULT - ASSESSMENT
per Internal Medicine    79YOF with PMH of HLD, Vascular Dementia ( A&Ox1), unspecified Cardiomyopathy, and GERD who presents for BRBPR, admitted for hematochezia and diverticulitis.     Problem/Plan - 1:  ·  Problem: Hematochezia.  Plan: Pt with BRBPR possibly 2/2 diverticulosis vs hemorrhoids in setting od straining with constipation prior to diarrhea. Currently improving, only 1 BM (blood spotting) since admission. Hb stable.   - Maintain active Type and screen   - CBCqd  - Monitor BMs   - Maintain 2 large bore IVs.     Problem/Plan - 2:  ·  Problem: Diverticulitis.  Plan: Pt with hematochezia and LLQ abd pain, found to have CT a/p recto-sigmoid thickness concerning for possible diverticulitis. Lactate neg but WBC 11, Bands 17. S/p GI consult with recs for tx of diverticulitis.  - C/w CFTZ 1gqd /flagyl 500mg TID   - F/u GI recs     #Sepsis  Pt tachycardic 110s, WBC 11, Bands 17 3/4 SIRS. Appropriate fluid resuscitation 2L NS likely all 2/2 intrabd. source as above.   - F/u BCX ,UA/UCX, CXR no infiltrate   - Abx as above.     Problem/Plan - 3:  ·  Problem: Transaminitis.  Plan: Pt with AST//117 on admission with unknown baseline. Possibly 2/2 chronic tylenol use (currently holding) vs NAFLD, vs  hepatitis (no cholestatic picture) vs 2/2 sepsis.   - hepatitis panel   - salicylate/ acet level nL   - f/u CMP qd  - F/u RUQ u/s     #asymmetrical RLE edema  Endorses present for a while (no exact time) denies tenderness.   - F/u RLE doppler for r/o dvt.     Problem/Plan - 4:  ·  Problem: Vascular dementia without behavioral disturbance.  Plan: PMH of vascular dementia daughter unclear if CVA in the past.   - Baseline AAOx1 HCP daughter  - F/u PT in am    #AMS/ Stroke R/o   Pt found to be more altered and endorsing severe HA worst in her life s/p stroke code and CT imaging negative for an acute process. Patient likely altered secondary to baseline dementia vs sepsis.   - F/u neurology recs  - Currently HA resolved as per patient but as per neurology note if continues, to have severe HA, consider LP to r/o SAH.     Problem/Plan - 5:  ·  Problem: Renal insufficiency.  Plan: Pt with Bun/ Cr 25/2.64, unknown baseline, likely multifactorial renal disease with pre-renal component in setting of diarrhea. S/p fluid repletion.   - F/u BMPqd  - F/u Ulytes    #Hyperglycemia   Pt with Glucose 300s in the afternoon while NPO, with no prior hx of DM2.  - F/u A1C   - mISS started.    Problem/Plan - 6:  Problem: Cardiomyopathy. Plan: PMH of cardiomyopathy as per daughter unknown etiology (does not recall if CAD) as she recently moved in with her.  - Obtain collateral from family members   - Holding ASA 325mg and follow up reasoning for this dose vs 81mg, and home Rosuvastatin 20qd holding in setting of transaminitis.    Problem/Plan - 7:  ·  Problem: HLD (hyperlipidemia).  Plan: PMH of HLD on home Rosuvastatin 20qd holding in setting of transaminitis.     Problem/Plan - 8:  ·  Problem: GERD (gastroesophageal reflux disease).  Plan: PMH of GERD on omeprazole 20qd c/w PPI here.     Problem/Plan - 9:  ·  Problem: Nutrition, metabolism, and development symptoms.  Plan: F: None   E: Replete for K<4 Mag<2  N: NPO   A: None now in setting of GIB, SCDs  FULL CODE  Dispo:  [facility].

## 2019-11-14 NOTE — PROGRESS NOTE ADULT - SUBJECTIVE AND OBJECTIVE BOX
GASTROENTEROLOGY PROGRESS NOTE  Patient seen and examined at bedside. Patient endorsing that she does not feel well, but is unable to vocalize in what way. Still endorsing LLQ pain. Denies bleeding, though is notably poor historian.     PERTINENT REVIEW OF SYSTEMS:  CONSTITUTIONAL: No weakness, fevers or chills  HEENT: No visual changes; No vertigo or throat pain   GASTROINTESTINAL: As above  NEUROLOGICAL: No numbness or weakness  SKIN: No itching, burning, rashes, or lesions     Allergies    codeine (Other)  Seafood (Other)  Sular (Other)    Intolerances      MEDICATIONS:  MEDICATIONS  (STANDING):  cefTRIAXone   IVPB 1000 milliGRAM(s) IV Intermittent every 24 hours  insulin lispro (HumaLOG) corrective regimen sliding scale   SubCutaneous Before meals and at bedtime  metroNIDAZOLE  IVPB 500 milliGRAM(s) IV Intermittent every 8 hours  pantoprazole    Tablet 40 milliGRAM(s) Oral before breakfast    MEDICATIONS  (PRN):    Vital Signs Last 24 Hrs  T(C): 36.7 (2019 05:44), Max: 37.2 (2019 23:27)  T(F): 98.1 (2019 05:44), Max: 98.9 (2019 23:27)  HR: 110 (2019 05:44) (110 - 118)  BP: 132/80 (2019 05:44) (124/73 - 161/76)  BP(mean): 98 (2019 23:27) (98 - 98)  RR: 18 (2019 05:44) (18 - 18)  SpO2: 90% (2019 05:44) (90% - 99%)    PHYSICAL EXAM:    General: Elderly, A&Ox1  HEENT: MMM, conjunctiva and sclera clear  Gastrointestinal: Tender to palpation diffusely without rebound or guarding  Skin: Warm and dry. No obvious rash    LABS:                        12.2   8.64  )-----------( 192      ( 2019 06:34 )             37.8     -14    135  |  104  |  21  ----------------------------<  197<H>  3.8   |  18<L>  |  2.39<H>    Ca    8.4      2019 06:34  Mg     1.8     11-13    TPro  6.9  /  Alb  3.1<L>  /  TBili  0.5  /  DBili  x   /  AST  139<H>  /  ALT  87<H>  /  AlkPhos  76  11-14    PT/INR - ( 2019 15:13 )   PT: 12.5 sec;   INR: 1.10          PTT - ( 2019 15:13 )  PTT:25.6 sec      Urinalysis Basic - ( 2019 01:21 )    Color: Yellow / Appearance: Clear / S.020 / pH: x  Gluc: x / Ketone: NEGATIVE  / Bili: Negative / Urobili: 0.2 E.U./dL   Blood: x / Protein: 100 mg/dL / Nitrite: NEGATIVE   Leuk Esterase: NEGATIVE / RBC: < 5 /HPF / WBC < 5 /HPF   Sq Epi: x / Non Sq Epi: 0-5 /HPF / Bacteria: Present /HPF                RADIOLOGY & ADDITIONAL STUDIES:  Reviewed

## 2019-11-14 NOTE — PHYSICAL THERAPY INITIAL EVALUATION ADULT - IMPAIRMENTS FOUND, PT EVAL
muscle strength/ergonomics and body mechanics/arousal, attention, and cognition/gait, locomotion, and balance/cognitive impairment/posture/ROM/aerobic capacity/endurance

## 2019-11-14 NOTE — PROGRESS NOTE ADULT - SUBJECTIVE AND OBJECTIVE BOX
I was called to assess the patient for possible vaginal bleeding.  s/p KEENAN BSO more than 10 years ago for benign reason (fibroid uterus), never bled since.  The patient refused speculum or vaginal exam - wishes to be examined tomorrow morning.  With the medicine resident in the room external genitalia was inspected - no overt vaginal bleeding seen, also no bleeding on the glove with a finger PV exam (no lumps were felt).  Of not her daughter (HCP) was not present in the room.  the patient will proceed with pelvic US (ordered) and will be examined tomorrow.

## 2019-11-14 NOTE — PROGRESS NOTE ADULT - PROBLEM SELECTOR PLAN 5
Pt with Bun/ Cr 25/2.64, unknown baseline, likely multifactorial renal disease with pre-renal component in setting of diarrhea. S/p fluid repletion.   - F/u BMPqd  - F/u Ulytes    #Hyperglycemia   Pt with Glucose 300s in the afternoon while NPO, with no prior hx of DM2.  - F/u A1C   - mISS started Pt with Bun/ Cr 25/2.64, unknown baseline, likely multifactorial renal disease with pre-renal component in setting of diarrhea. S/p fluid repletion.   - F/u BMPqd  - F/u Ulytes  - Fena 1.8%, likely intrinsic

## 2019-11-14 NOTE — PHYSICAL THERAPY INITIAL EVALUATION ADULT - ADDITIONAL COMMENTS
Per pt, she reports living in a house on the 2nd floor and uses railings to get inside her home where she lives with her grandson. Denies living with anyone else in the house. Reports owning RW, SC, commode, shower chair and wheelchair but does not report ambulating with AD/propelling with w/c or using shower chair. To get to appointments pt states she uses a cab and that one of her daughters helps her with groceries. Reports 1 fall in the last year from which she states she independently recovered, denies hitting her head or requiring hospitalization. Discrepancy between pt and daughter's report to HEATHER Figueroa. Per pt, she reports living in a house on the 2nd floor and uses railings to get inside her home where she lives with her grandson. Denies living with anyone else in the house. Reports owning RW, SC, commode, shower chair and wheelchair but does not report ambulating with AD/propelling with w/c or using shower chair. To get to appointments pt states she uses a cab and that one of her daughters helps her with groceries. Reports 1 fall in the last year from which she states she independently recovered, denies hitting her head or requiring hospitalization. Per HEATHER Figueroa after talking to daughter: pt lives in [facility] apt with daughter (also limited), with hospital bed, commode, RW, WC, SC.

## 2019-11-14 NOTE — PROGRESS NOTE ADULT - SUBJECTIVE AND OBJECTIVE BOX
**INCOMPLETE NOTE    OVERNIGHT EVENTS:    SUBJECTIVE:  Patient seen and examined at bedside.    Vital Signs Last 12 Hrs  T(F): 98.1 (19 @ 05:44), Max: 98.9 (19 @ 23:27)  HR: 110 (19 @ 05:44) (110 - 111)  BP: 132/80 (19 @ 05:44) (129/83 - 132/80)  BP(mean): 98 (19 @ 23:27) (98 - 98)  RR: 18 (19 @ 05:44) (18 - 18)  SpO2: 90% (19 @ 05:44) (90% - 91%)  I&O's Summary      PHYSICAL EXAM:  Constitutional: NAD, comfortable in bed.  HEENT: NC/AT, PERRLA, EOMI, no conjunctival pallor or scleral icterus, MMM  Neck: Supple, no JVD  Respiratory: CTA B/L. No w/r/r.   Cardiovascular: RRR, normal S1 and S2, no m/r/g.   Gastrointestinal: +BS, soft NTND, no guarding or rebound tenderness, no palpable masses   Extremities: wwp; no cyanosis, clubbing or edema.   Vascular: Pulses equal and strong throughout.   Neurological: AAOx3, no CN deficits, strength and sensation intact throughout.   Skin: No gross skin abnormalities or rashes        LABS:                        12.2   8.64  )-----------( 192      ( 2019 06:34 )             37.8     -    135  |  104  |  21  ----------------------------<  197<H>  3.8   |  18<L>  |  2.39<H>    Ca    8.4      2019 06:34  Mg     1.8     -    TPro  6.9  /  Alb  3.1<L>  /  TBili  0.5  /  DBili  x   /  AST  139<H>  /  ALT  87<H>  /  AlkPhos  76  11-14    PT/INR - ( 2019 15:13 )   PT: 12.5 sec;   INR: 1.10          PTT - ( 2019 15:13 )  PTT:25.6 sec  Urinalysis Basic - ( 2019 01:21 )    Color: Yellow / Appearance: Clear / S.020 / pH: x  Gluc: x / Ketone: NEGATIVE  / Bili: Negative / Urobili: 0.2 E.U./dL   Blood: x / Protein: 100 mg/dL / Nitrite: NEGATIVE   Leuk Esterase: NEGATIVE / RBC: < 5 /HPF / WBC < 5 /HPF   Sq Epi: x / Non Sq Epi: 0-5 /HPF / Bacteria: Present /HPF          RADIOLOGY & ADDITIONAL TESTS:    MEDICATIONS  (STANDING):  cefTRIAXone   IVPB 1000 milliGRAM(s) IV Intermittent every 24 hours  insulin lispro (HumaLOG) corrective regimen sliding scale   SubCutaneous Before meals and at bedtime  metroNIDAZOLE  IVPB 500 milliGRAM(s) IV Intermittent every 8 hours  pantoprazole    Tablet 40 milliGRAM(s) Oral before breakfast    MEDICATIONS  (PRN): SUBJECTIVE:  Patient seen and examined at bedside. She is a poor historian given her mental status. She knows her full name but she does not recall where she is and how and why she got to the hospital. She denies any pain anywhere and just says that she is hungry and thirsty. Otherwise, she denies any fevers, chills, chest pain, shortness of breath, abdominal pain, nausea, vomiting.     Vital Signs Last 12 Hrs  T(F): 98.1 (19 @ 05:44), Max: 98.9 (19 @ 23:27)  HR: 110 (19 @ 05:44) (110 - 111)  BP: 132/80 (19 @ 05:44) (129/83 - 132/80)  BP(mean): 98 (19 @ 23:27) (98 - 98)  RR: 18 (19 @ 05:44) (18 - 18)  SpO2: 90% (19 @ 05:44) (90% - 91%)  I&O's Summary      PHYSICAL EXAM:  Constitutional: NAD, comfortable in bed.  HEENT: NC/AT, PERRLA, EOMI, no conjunctival pallor or scleral icterus, MMM  Neck: Supple, no JVD  Respiratory: CTA B/L. No w/r/r.   Cardiovascular: RRR, normal S1 and S2, no m/r/g.   Gastrointestinal: +BS, soft NTND, no guarding or rebound tenderness, no palpable masses   Extremities: wwp; no cyanosis, clubbing or edema.   Vascular: Pulses equal and strong throughout.   Neurological: AAOx3, no CN deficits, strength and sensation intact throughout.   Skin: No gross skin abnormalities or rashes        LABS:                        12.2   8.64  )-----------( 192      ( 2019 06:34 )             37.8     11-14    135  |  104  |  21  ----------------------------<  197<H>  3.8   |  18<L>  |  2.39<H>    Ca    8.4      2019 06:34  Mg     1.8     -    TPro  6.9  /  Alb  3.1<L>  /  TBili  0.5  /  DBili  x   /  AST  139<H>  /  ALT  87<H>  /  AlkPhos  76  11-14    PT/INR - ( 2019 15:13 )   PT: 12.5 sec;   INR: 1.10          PTT - ( 2019 15:13 )  PTT:25.6 sec  Urinalysis Basic - ( 2019 01:21 )    Color: Yellow / Appearance: Clear / S.020 / pH: x  Gluc: x / Ketone: NEGATIVE  / Bili: Negative / Urobili: 0.2 E.U./dL   Blood: x / Protein: 100 mg/dL / Nitrite: NEGATIVE   Leuk Esterase: NEGATIVE / RBC: < 5 /HPF / WBC < 5 /HPF   Sq Epi: x / Non Sq Epi: 0-5 /HPF / Bacteria: Present /HPF          RADIOLOGY & ADDITIONAL TESTS:    MEDICATIONS  (STANDING):  cefTRIAXone   IVPB 1000 milliGRAM(s) IV Intermittent every 24 hours  insulin lispro (HumaLOG) corrective regimen sliding scale   SubCutaneous Before meals and at bedtime  metroNIDAZOLE  IVPB 500 milliGRAM(s) IV Intermittent every 8 hours  pantoprazole    Tablet 40 milliGRAM(s) Oral before breakfast    MEDICATIONS  (PRN): SUBJECTIVE:  Patient seen and examined at bedside. She is a poor historian given her mental status. She knows her full name but she does not recall where she is and how and why she got to the hospital. She denies any pain anywhere and just says that she is hungry and thirsty. Otherwise, she denies any fevers, chills, chest pain, shortness of breath, abdominal pain, nausea, vomiting. She denies any bowel movements since the ED.     Vital Signs Last 12 Hrs  T(F): 98.1 (19 @ 05:44), Max: 98.9 (19 @ 23:27)  HR: 110 (19 @ 05:44) (110 - 111)  BP: 132/80 (19 @ 05:44) (129/83 - 132/80)  BP(mean): 98 (19 @ 23:27) (98 - 98)  RR: 18 (19 @ 05:44) (18 - 18)  SpO2: 90% (19 @ 05:44) (90% - 91%)  I&O's Summary      PHYSICAL EXAM:  Constitutional: NAD, comfortable in bed.  HEENT: NC/AT, PERRLA, EOMI, no conjunctival pallor or scleral icterus, MMM  Neck: Supple, no JVD  Respiratory: CTA B/L. No w/r/r.   Cardiovascular: RRR, normal S1 and S2, no m/r/g.   Gastrointestinal: +BS, soft NTND, no guarding or rebound tenderness, no palpable masses   Extremities: wwp; no cyanosis, clubbing or edema.   Vascular: Pulses equal and strong throughout.   Neurological: AAOx1, no CN deficits, strength and sensation intact throughout.   Skin: No gross skin abnormalities or rashes        LABS:                        12.2   8.64  )-----------( 192      ( 2019 06:34 )             37.8     11-14    135  |  104  |  21  ----------------------------<  197<H>  3.8   |  18<L>  |  2.39<H>    Ca    8.4      2019 06:34  Mg     1.8     11-13    TPro  6.9  /  Alb  3.1<L>  /  TBili  0.5  /  DBili  x   /  AST  139<H>  /  ALT  87<H>  /  AlkPhos  76  11-14    PT/INR - ( 2019 15:13 )   PT: 12.5 sec;   INR: 1.10          PTT - ( 2019 15:13 )  PTT:25.6 sec  Urinalysis Basic - ( 2019 01:21 )    Color: Yellow / Appearance: Clear / S.020 / pH: x  Gluc: x / Ketone: NEGATIVE  / Bili: Negative / Urobili: 0.2 E.U./dL   Blood: x / Protein: 100 mg/dL / Nitrite: NEGATIVE   Leuk Esterase: NEGATIVE / RBC: < 5 /HPF / WBC < 5 /HPF   Sq Epi: x / Non Sq Epi: 0-5 /HPF / Bacteria: Present /HPF          RADIOLOGY & ADDITIONAL TESTS:    MEDICATIONS  (STANDING):  cefTRIAXone   IVPB 1000 milliGRAM(s) IV Intermittent every 24 hours  insulin lispro (HumaLOG) corrective regimen sliding scale   SubCutaneous Before meals and at bedtime  metroNIDAZOLE  IVPB 500 milliGRAM(s) IV Intermittent every 8 hours  pantoprazole    Tablet 40 milliGRAM(s) Oral before breakfast    MEDICATIONS  (PRN): SUBJECTIVE:  Patient seen and examined at bedside. She is a poor historian given her mental status. She knows her full name but she does not recall where she is and how and why she got to the hospital. She denies any pain anywhere and just says that she is hungry and thirsty. Otherwise, she denies any fevers, chills, chest pain, shortness of breath, abdominal pain, nausea, vomiting. She denies any bowel movements since the ED.     Vital Signs Last 12 Hrs  T(F): 98.1 (19 @ 05:44), Max: 98.9 (19 @ 23:27)  HR: 110 (19 @ 05:44) (110 - 111)  BP: 132/80 (19 @ 05:44) (129/83 - 132/80)  BP(mean): 98 (19 @ 23:27) (98 - 98)  RR: 18 (19 @ 05:44) (18 - 18)  SpO2: 90% (19 @ 05:44) (90% - 91%)  I&O's Summary      PHYSICAL EXAM:  Constitutional: NAD, comfortable in bed, flat affect  HEENT: NC/AT, PERRLA, EOMI, no conjunctival pallor or scleral icterus, MMM  Neck: Supple, no JVD  Respiratory: CTA B/L. No w/r/r.   Cardiovascular: RRR, normal S1 and S2, no m/r/g.   Gastrointestinal: +BS, soft, diffusely tender on palpation in all four quadrants, no guarding or rebound tenderness, no palpable masses   Extremities: RLE nonpitting edema, nontender, wwp; no cyanosis, clubbing   Vascular: Pulses equal and strong throughout.   Neurological: AAOx1, no CN deficits, strength and sensation intact throughout.   Skin: No gross skin abnormalities or rashes        LABS:                        12.2   8.64  )-----------( 192      ( 2019 06:34 )             37.8         135  |  104  |  21  ----------------------------<  197<H>  3.8   |  18<L>  |  2.39<H>    Ca    8.4      2019 06:34  Mg     1.8     -    TPro  6.9  /  Alb  3.1<L>  /  TBili  0.5  /  DBili  x   /  AST  139<H>  /  ALT  87<H>  /  AlkPhos  76  11-14    PT/INR - ( 2019 15:13 )   PT: 12.5 sec;   INR: 1.10          PTT - ( 2019 15:13 )  PTT:25.6 sec  Urinalysis Basic - ( 2019 01:21 )    Color: Yellow / Appearance: Clear / S.020 / pH: x  Gluc: x / Ketone: NEGATIVE  / Bili: Negative / Urobili: 0.2 E.U./dL   Blood: x / Protein: 100 mg/dL / Nitrite: NEGATIVE   Leuk Esterase: NEGATIVE / RBC: < 5 /HPF / WBC < 5 /HPF   Sq Epi: x / Non Sq Epi: 0-5 /HPF / Bacteria: Present /HPF          RADIOLOGY & ADDITIONAL TESTS:    MEDICATIONS  (STANDING):  cefTRIAXone   IVPB 1000 milliGRAM(s) IV Intermittent every 24 hours  insulin lispro (HumaLOG) corrective regimen sliding scale   SubCutaneous Before meals and at bedtime  metroNIDAZOLE  IVPB 500 milliGRAM(s) IV Intermittent every 8 hours  pantoprazole    Tablet 40 milliGRAM(s) Oral before breakfast    MEDICATIONS  (PRN):

## 2019-11-14 NOTE — PHYSICAL THERAPY INITIAL EVALUATION ADULT - CRITERIA FOR SKILLED THERAPEUTIC INTERVENTIONS
impairments found/therapy frequency/anticipated equipment needs at discharge/anticipated discharge recommendation/predicted duration of therapy intervention/risk reduction/prevention/rehab potential/functional limitations in following categories

## 2019-11-14 NOTE — PROGRESS NOTE ADULT - PROBLEM SELECTOR PLAN 9
F: None   E: Replete for K<4 Mag<2  N: NPO   A: None now in setting of GIB, SCDs  FULL CODE  Dispo:  [facility] F: None   E: Replete for K<4 Mag<2  N: DASH/TLC diet  A: None now in setting of GIB, SCDs  FULL CODE  Dispo:  [facility]

## 2019-11-14 NOTE — PROGRESS NOTE ADULT - PROBLEM SELECTOR PLAN 3
Pt with AST//117 on admission with unknown baseline. Possibly 2/2 chronic tylenol use (currently holding) vs NAFLD, vs  hepatitis (no cholestatic picture) vs 2/2 sepsis.   - hepatitis panel   - salicylate/ acet level nL   - f/u CMP qd  - F/u RUQ u/s     #asymmetrical RLE edema  Endorses present for a while (no exact time) denies tenderness.   - F/u RLE doppler for r/o dvt Pt with hematochezia and LLQ abd pain, found to have CT a/p recto-sigmoid thickness concerning for possible diverticulitis. Lactate neg but WBC 11, Bands 17. S/p GI consult with recs for tx of diverticulitis.  - C/w CFTZ 1gqd /flagyl 500mg TID   - F/u GI recs     #Sepsis  Pt tachycardic 110s, WBC 11, Bands 17 3/4 SIRS. Appropriate fluid resuscitation 2L NS likely all 2/2 intrabd. source as above.   - F/u BCX ,UA/UCX, CXR no infiltrate   - Abx as above Pt with AST//117 on admission with unknown baseline. Possibly 2/2 chronic Tylenol use (currently holding) vs NAFLD, vs  hepatitis (no cholestatic picture) vs 2/2 sepsis.   - hepatitis panel negative  - salicylate/ acet level nL  - f/u CMP qd  - F/u RUQ u/s   - as per GI - f/u Iron studies, Ceruloplasmin, Alpha 1 Antitrypsin, Hepatitis B and C serologies, KAJAL, ASMA, AMA, IgG    #asymmetrical RLE edema  Endorses present for a while (no exact time) denies tenderness.   - F/u RLE doppler for r/o dvt

## 2019-11-14 NOTE — PROGRESS NOTE ADULT - ASSESSMENT
80yo F with a PMHx of HLD, dementia (reportedly A&Ox1 at baseline), unspecified cardiomyopathy, and GERD who was sent in by per PMD after evaluation for bright red blood per rectum. GI consulted for rectal bleeding.    1. Rectal bleeding - Patient reportedly with multiple episodes of BRBPR noted in her diaper, no reported overt melena or hematochezia. Story of straining with passage of stool suggestive of hemorrhoidal bleeding, however, bandemia and tachycardia meeting SIRS criteria with LLQ pain and diarrhea concerning for diverticulitis/diverticular etiology of bleeding. Pain at time of BM concerning for ischemic colitis, though lactate negative.   - Continue Ceftriaxone/Flagyl for colitis/diverticulitis  - f/u GI PCR, Stool Culture  - Maintain active T&S, large bore IV access  - Serial Hgb/Hct  - Transfusion threshold per primary team    2. Elevated LTs - Patient with elevated LTs on presentation, , ALT 70, normal bilirubin and alk phos suggestive of hepatocellular pattern. No reported history of liver disease, daughter denies EtOH history. Tylenol noted as home medication. No INR/PT prolongation noted on initial labs. Acetaminophen and Tylenol levels wnl.  - Obtain outpatient labs for collateral  - Complete abdominal US w/doppler  - Medications reviewed on [employer] database: Acetaminophen class A, Aspirin class A, Rosuvastatin class B, Omeprazole class B, Zolpidem class E, Trazodone rare (letters corresponding to likelihood of etiology of liver injury)  - Please obtain lipid panel, A1c, Iron studies, Ceruloplasmin, Alpha 1 Antitrypsin, Hepatitis B and C serologies, KAJAL, ASMA, AMA, IgG  - Daily CMP and Coags    Recommendations discussed with primary team  Case discussed with attending physician 80yo F with a PMHx of HLD, dementia (reportedly A&Ox1 at baseline), unspecified cardiomyopathy, and GERD who was sent in by per PMD after evaluation for bright red blood per rectum. GI consulted for rectal bleeding.    1. Rectal bleeding - Patient reportedly with multiple episodes of BRBPR noted in her diaper, no reported overt melena or hematochezia. Story of straining with passage of stool suggestive of hemorrhoidal bleeding, however, bandemia and tachycardia meeting SIRS criteria with LLQ pain and diarrhea concerning for diverticulitis/diverticular etiology of bleeding. Pain at time of BM concerning for ischemic colitis, though lactate negative.   - Continue Ceftriaxone/Flagyl for colitis/diverticulitis  - f/u GI PCR, Stool Culture  - Maintain active T&S, large bore IV access  - Serial Hgb/Hct  - Transfusion threshold per primary team    2. Elevated LTs - Patient with elevated LTs on presentation, , ALT 70, normal bilirubin and alk phos suggestive of hepatocellular pattern. No reported history of liver disease, daughter denies EtOH history. Tylenol noted as home medication. No INR/PT prolongation noted on initial labs. Acetaminophen and Tylenol levels wnl.  - Obtain outpatient labs for collateral  - Complete abdominal US w/doppler  - Medications reviewed on [employer] LiverTox database: Acetaminophen class A, Aspirin class A, Rosuvastatin class B, Omeprazole class B, Zolpidem class E, Trazodone rare (letters corresponding to likelihood of etiology of liver injury)  - Please obtain lipid panel, A1c, Iron studies, Ceruloplasmin, Alpha 1 Antitrypsin, Hepatitis B and C serologies, KAJAL, ASMA, AMA, IgG  - Daily CMP and Coags  - UA with large blood but low RBCs with elevated Cr, may be element of rhabdomyolysis which can increase liver enzymes    Recommendations discussed with primary team  Case discussed with attending physician

## 2019-11-14 NOTE — PHYSICAL THERAPY INITIAL EVALUATION ADULT - PLANNED THERAPY INTERVENTIONS, PT EVAL
ROM/transfer training/balance training/bed mobility training/strengthening/gait training/postural re-education

## 2019-11-14 NOTE — PROGRESS NOTE ADULT - PROBLEM SELECTOR PLAN 4
PMH of vascular dementia daughter unclear if CVA in the past.   - Baseline AAOx1 HCP daughter  - F/u PT in am    #AMS/ Stroke R/o   Pt found to be more altered and endorsing severe HA worst in her life s/p stroke code and CT imaging negative for an acute process. Patient likely altered secondary to baseline dementia vs sepsis.   - F/u neurology recs  - Currently HA resolved as per patient but as per neurology note if continues, to have severe HA, consider LP to r/o SAH PMH of vascular dementia daughter unclear if CVA in the past.   - Baseline AAOx1 HCP daughter  - F/u PT in am  #AMS/ Stroke R/o   Pt found to be more altered and endorsing severe HA worst in her life s/p stroke code and CT imaging negative for an acute process. Patient likely altered secondary to baseline dementia vs sepsis.   - F/u neurology recs  - Currently HA resolved as per patient but as per neurology note if continues, to have severe HA, consider LP to r/o SAH

## 2019-11-14 NOTE — PROGRESS NOTE ADULT - ASSESSMENT
79YOF with PMH of HLD, Vascular Dementia ( A&Ox1), unspecified Cardiomyopathy, and GERD who presents for BRBPR, admitted for hematochezia and diverticulitis.

## 2019-11-14 NOTE — PHYSICAL THERAPY INITIAL EVALUATION ADULT - PHYSICAL ASSIST/NONPHYSICAL ASSIST, REHAB EVAL
1 person assist/nonverbal cues (demo/gestures)/set-up required/HOB elevated, hand held guidance to bed rail/verbal cues

## 2019-11-14 NOTE — CONSULT NOTE ADULT - SUBJECTIVE AND OBJECTIVE BOX
Patient is a 79y old  Female who presents with a chief complaint of hematochezia (2019 22:36)       HPI:  79YOF with PMH of HLD, Vascular Dementia ( A&Ox1), unspecified Cardiomyopathy, and GERD who presents for BRBPR. As per daughter on the phone, patient patient had been having constipation for a couple of days which led to straining with subsequent diarrhea w/ BRBPR which started as spotting and then to more sean bleeding (atleast 3 bouts). Pt endorsed to her daughter LLQ pain as well but denied any hemat(emesis), sick contacts, fever or chills at home, or prior episodes of BRBPR. Pt had C-scope years ago but daughter could not recall findings. In the ambulance, patient found to be altered and endorsing severe HA (worst headache of her life), therefore stroke code was called with negative imaging. GI consulted in ED s/p CTa/p with evidence of recto-sig thickening, recommending treatment for diverticulitis as well as workup for transaminitis. Pt seen at ED bedside at 9pm endorsing that HA has resolved, denies any blurry vision, new motor or sensation changes, and endorses no abd. pain at rest (just tenderness to palpation), and denies any nausea, (last BM had spotting of blood only once since admission).   At ED vitals:  afebrile, HR 110s, -140/70s, 90--. 95% 2L NC  Labs s/f: WBC 11, Bands 17, Lact 1.8, Hb 13.6, Bun/ Cr 25/2.64, AST//117    Pt given: CFTZ/flagyl, zofran 4 IVP, 2L NS (2019 22:36)      PAST MEDICAL & SURGICAL HISTORY:  GERD (gastroesophageal reflux disease)  Cardiomyopathy  Dementia  HLD (hyperlipidemia)  No significant past surgical history      MEDICATIONS  (STANDING):  cefTRIAXone   IVPB 1000 milliGRAM(s) IV Intermittent every 24 hours  insulin lispro (HumaLOG) corrective regimen sliding scale   SubCutaneous Before meals and at bedtime  metroNIDAZOLE  IVPB 500 milliGRAM(s) IV Intermittent every 8 hours  pantoprazole    Tablet 40 milliGRAM(s) Oral before breakfast    MEDICATIONS  (PRN):      Social History:           -  Occupation: X           -  Home Living Status: states she lives with her daughter in an elevator accessible apartment building           -  Prior Home Care Services:  denied    Functional Level Prior to Admission:           - ADL's:  "my daughter helps me"           - "I use a wheelchair"    FAMILY HISTORY:      CBC Full  -  ( 2019 06:34 )  WBC Count : 8.64 K/uL  RBC Count : 4.24 M/uL  Hemoglobin : 12.2 g/dL  Hematocrit : 37.8 %  Platelet Count - Automated : 192 K/uL  Mean Cell Volume : 89.2 fl  Mean Cell Hemoglobin : 28.8 pg  Mean Cell Hemoglobin Concentration : 32.3 gm/dL  Auto Neutrophil # : x  Auto Lymphocyte # : x  Auto Monocyte # : x  Auto Eosinophil # : x  Auto Basophil # : x  Auto Neutrophil % : x  Auto Lymphocyte % : x  Auto Monocyte % : x  Auto Eosinophil % : x  Auto Basophil % : x      11-14    135  |  104  |  21  ----------------------------<  197<H>  3.8   |  18<L>  |  2.39<H>    Ca    8.4      2019 06:34  Mg     1.8         TPro  6.9  /  Alb  3.1<L>  /  TBili  0.5  /  DBili  x   /  AST  139<H>  /  ALT  87<H>  /  AlkPhos  76  11-      Urinalysis Basic - ( 2019 01:21 )    Color: Yellow / Appearance: Clear / S.020 / pH: x  Gluc: x / Ketone: NEGATIVE  / Bili: Negative / Urobili: 0.2 E.U./dL   Blood: x / Protein: 100 mg/dL / Nitrite: NEGATIVE   Leuk Esterase: NEGATIVE / RBC: < 5 /HPF / WBC < 5 /HPF   Sq Epi: x / Non Sq Epi: 0-5 /HPF / Bacteria: Present /HPF          Radiology:    < from: CT Brain Stroke Protocol (.19 @ 14:58) >  EXAM:  CT BRAIN STROKE PROTOCOL                          PROCEDURE DATE:  2019          INTERPRETATION:  Clinical history/reason for exam: Stroke code, slurred   speech and mental status. Headache.    Technique: Multiple contiguous axial CT images of the head were obtained   from the base of the skull to the vertex without the administration of   intravenous contrast. Coronal and sagittal reformatted images were   obtained.    Comparison:None    Findings:     The ventricles and sulci are prominent consistent with parenchymal volume   loss.    No evidence for intracranial hemorrhage, significant space occupying   process or recent territorial infarction.  No acute extra-axial fluid   collections are identified.    Gray-white matter differentiation is preserved. There are patchy foci of   periventricular and subcortical hypodensities consistent with moderate   chronic microvascular ischemic disease. .     The bones of the calvarium are intact.      The paranasal sinuses and bilateral mastoid complexes are well aerated.    Impression:     No acute intracranial hemorrhage, mass effect or demarcated territorial   infarct. Chronic microvascular ischemic changes.        < from: CT Perfusion w/ Maps w/ IV Cont (19 @ 15:15) >    EXAM:  CT PERFUSION W MAPS IC                          PROCEDURE DATE:  2019          INTERPRETATION:  PROCEDURE: CT Perfusion with intravenous contrast.    INDICATION: Stroke code, slurred speech.    TECHNIQUE: Following the intravenous administration of 40 ml of Optiray   350, serial axial images were obtained through the brain. The CT   perfusion data set was post processed per [facility] protocol   generating color maps of CBF, CBV, MTT, and Tmax.    COMPARISON: None    FINDINGS: The CT perfusion study demonstrates no perfusion abnormality.   There is no mismatch volume.    IMPRESSION: Normal CT perfusion study.        < from: CT Abdomen and Pelvis w/ Oral Cont (19 @ 19:15) >  EXAM:  CT ABDOMEN AND PELVIS OC                          PROCEDURE DATE:  2019          INTERPRETATION:  CLINICAL INFORMATION: Abdominal pain, diarrhea    COMPARISON: None.    PROCEDURE:   CT of the Abdomen and Pelvis was performed without intravenous contrast.   Intravenous contrast: None.  Oral contrast: positive contrast was administered.  Sagittal and coronal reformats were performed.    FINDINGS:    Lower chest: Within normal limits.    Liver: Normal in size and morphology. Scatteredhypodensity, measuring up   to 1.4 cm, likely hepatic cysts..  Gallbladder and biliary ducts: Status post cholecystectomy. No biliary   ductal dilatation.  Spleen: Within normal limits.  Pancreas: Within normal limits.  Adrenals: Within normal limits.  Kidneys/ureters: Hyperdense contrast within the bilateral collecting   system, likely from prior CT. No hydronephrosis. No urinary calculi.   Bilateral renal cysts..    Bladder: Within normal limits.  Reproductive organs: Probable hysterectomy versus atrophic uterus.    Bowel: No evidence of bowel obstruction. Long segment circumferential   bowel wall thickening in the rectosigmoid colon (5:) with pericolic   fat stranding. Colonic diverticulosis in descending and sigmoid colon.   Probable rectal prolapse.  Peritoneum/retroperitoneum: No ascites.  Vasculature:  The aorta and its branches are normal in caliber.  Lymph nodes: Lymph nodes are not enlarged.    Bones and soft tissue: Degenerative change in the spine.      IMPRESSION:   Long segment circumferential wall thickening in the rectosigmoid colon,   however limited evaluation of the rectum without opacification with oral   contrast. The findings likely reflect infectious colitis, however   neoplasm cannot be excluded. Diverticulitis is less likely. Clinical   correlation and colonoscopy is recommended.          Vital Signs Last 24 Hrs  T(C): 36.7 (2019 05:44), Max: 37.2 (2019 23:27)  T(F): 98.1 (2019 05:44), Max: 98.9 (2019 23:27)  HR: 110 (2019 05:44) (110 - 118)  BP: 132/80 (2019 05:44) (124/73 - 161/76)  BP(mean): 98 (2019 23:27) (98 - 98)  RR: 18 (2019 05:44) (18 - 18)  SpO2: 90% (2019 05:44) (90% - 99%)    REVIEW OF SYSTEMS:    CONSTITUTIONAL: No fever, weight loss, or fatigue  EYES: No eye pain, visual disturbances, or discharge  ENMT:  No difficulty hearing, tinnitus, vertigo; No sinus or throat pain  NECK: No pain or stiffness  BREASTS: No pain, masses, or nipple discharge  RESPIRATORY: No cough, wheezing, chills or hemoptysis; No shortness of breath  CARDIOVASCULAR: No chest pain, palpitations, dizziness, or leg swelling  GASTROINTESTINAL: abdominal pain  GENITOURINARY: No dysuria, frequency, hematuria, or incontinence  NEUROLOGICAL: No headaches, memory loss, loss of strength, numbness, or tremors  SKIN: No itching, burning, rashes, or lesions   LYMPH NODES: No enlarged glands  ENDOCRINE: No heat or cold intolerance; No hair loss  MUSCULOSKELETAL: No joint pain or swelling; No muscle, back, or extremity pain  PSYCHIATRIC: No depression, anxiety, mood swings, or difficulty sleeping  HEME/LYMPH: No easy bruising, or bleeding gums  ALLERGY AND IMMUNOLOGIC: No hives or eczema  VASCULAR: no swelling, erythema :      Physical Exam: 80 yo AA woman lying in semi Auguste's position, w/o acute complaints, "feels tired"    Head: normocephalic, atraumatic    Eyes: PERRLA, EOMI, no nystagmus, sclera anicteric    ENT: nasal discharge, uvula midline, no oropharyngeal erythema/exudate    Neck: supple, negative JVD, negative carotid bruits, no thyromegaly    Chest: CTA bilaterally, neg wheeze/ rhonchi/ rales/ crackles/ egophany    Cardiovascular: regular rate and rhythm, neg murmurs/rubs/gallops    Abdomen: soft, non distended, mild epigastric tenderness, negative rebound/guarding, normal bowel sounds, neg hepatosplenomegaly    Extremities: WWP, neg cyanosis/clubbing/edema, negative calf tenderness to palpation, negative Rajan's sign    :     Neurologic Exam:    Alert and oriented x 1 to person, speech fluent w/o dysarthria, follows commands    Cranial Nerves:     II:                       pupils equal, round and reactive to light, visual fields intact   III/ IV/VI:            extraocular movements intact, neg nystagmus, neg ptosis  V:                       facial sensation intact, V1-3 normal  VII:                     face symmetric, no droop, normal eye closure and smile  VIII:                    hearing intact to finger rub bilaterally  IX/ X:                 soft palate rise symmetrical  XI:                      head turning, shoulder shrug normal  XII:                     tongue midline    Motor Exam:    Upper Extremities:     RIght:   poor effort > 3/5    Left :     poor effort > 3/5    Lower Extremities:                 Right:     poor effort > 3/5                 Left:        poor effort > 3/5                 Sensory:    intact to LT/PP in all UE/LE dermatomes    DTR:            = biceps/     triceps/     brachioradialis                      = patella/   medial hamstring/ankle                      neg clonus                      neg Babinski                      Gait:  not tested        PM&R Impression:    1) deconditioned  2) no focal weakness        Recommendations:    1) Physical therapy focusing on therapeutic exercises, bed mobility/transfer out of bed evaluation, progressive ambulation with assistive devices prn.    2) Current Disposition Plan/Recs: pending functional progress

## 2019-11-14 NOTE — PROGRESS NOTE ADULT - PROBLEM SELECTOR PLAN 6
PMH of cardiomyopathy as per daughter unknown etiology (does not recall if CAD) as she recently moved in with her.  - Obtain collateral from family members   - Holding ASA 325mg and follow up reasoning for this dose vs 81mg, and home Rosuvastatin 20qd holding in setting of transaminitis

## 2019-11-14 NOTE — PHYSICAL THERAPY INITIAL EVALUATION ADULT - GENERAL OBSERVATIONS, REHAB EVAL
Chart reviewed, DEVIN Herrera cleared pt for PT, IE completed. Admitted for hematochezia and diverticulitis. Recv'd modified supine sleeping, +heplock, +bed alarm/call bell, A&Ox2, denies SOB/dizziness but c/o abdominal pain 8-10/10 at rest and with activity. Tolerated session fairly with max encouragement, Madiha bed mobility, seated EOB ~10 minutes SpO2 88-93% throughout requiring DBE's. Refused sit to stand 2/2 pain. Impairments: dec strength, dec balance, dec endurance, deconditioning, dec cognition. Pt left modified supine, lines in tact, +bed alarm/call  bell, no new complaints and stable vitals. DEVIN Herrera aware.

## 2019-11-14 NOTE — PHYSICAL THERAPY INITIAL EVALUATION ADULT - ACTIVE RANGE OF MOTION EXAMINATION, REHAB EVAL
R knee ext limited to ~(-)20degrees, L knee extension (-)10degrees/bilateral upper extremity Active ROM was WFL (within functional limits)

## 2019-11-14 NOTE — PHYSICAL THERAPY INITIAL EVALUATION ADULT - PERTINENT HX OF CURRENT PROBLEM, REHAB EVAL
78yo F with a PMHx of HLD, dementia (reportedly A&Ox1 at baseline), unspecified cardiomyopathy, and GERD who was sent in by per PMD after evaluation for bright red blood per rectum. GI consulted for rectal bleeding.

## 2019-11-15 DIAGNOSIS — E87.2 ACIDOSIS: ICD-10-CM

## 2019-11-15 LAB
A1AT SERPL-MCNC: 224 MG/DL — HIGH (ref 90–200)
ALBUMIN SERPL ELPH-MCNC: 3.1 G/DL — LOW (ref 3.3–5)
ALP SERPL-CCNC: 84 U/L — SIGNIFICANT CHANGE UP (ref 40–120)
ALT FLD-CCNC: 88 U/L — HIGH (ref 10–45)
ANION GAP SERPL CALC-SCNC: 18 MMOL/L — HIGH (ref 5–17)
APTT BLD: 25.4 SEC — LOW (ref 27.5–36.3)
AST SERPL-CCNC: 127 U/L — HIGH (ref 10–40)
B-OH-BUTYR SERPL-SCNC: 2.1 MMOL/L — HIGH
BILIRUB SERPL-MCNC: 0.5 MG/DL — SIGNIFICANT CHANGE UP (ref 0.2–1.2)
BUN SERPL-MCNC: 20 MG/DL — SIGNIFICANT CHANGE UP (ref 7–23)
CALCIUM SERPL-MCNC: 8.3 MG/DL — LOW (ref 8.4–10.5)
CERULOPLASMIN SERPL-MCNC: 31 MG/DL — SIGNIFICANT CHANGE UP (ref 16–45)
CHLORIDE SERPL-SCNC: 105 MMOL/L — SIGNIFICANT CHANGE UP (ref 96–108)
CO2 SERPL-SCNC: 16 MMOL/L — LOW (ref 22–31)
CREAT SERPL-MCNC: 2.44 MG/DL — HIGH (ref 0.5–1.3)
CULTURE RESULTS: SIGNIFICANT CHANGE UP
FERRITIN SERPL-MCNC: 599 NG/ML — HIGH (ref 15–150)
GLUCOSE BLDC GLUCOMTR-MCNC: 107 MG/DL — HIGH (ref 70–99)
GLUCOSE BLDC GLUCOMTR-MCNC: 115 MG/DL — HIGH (ref 70–99)
GLUCOSE BLDC GLUCOMTR-MCNC: 126 MG/DL — HIGH (ref 70–99)
GLUCOSE BLDC GLUCOMTR-MCNC: 158 MG/DL — HIGH (ref 70–99)
GLUCOSE SERPL-MCNC: 116 MG/DL — HIGH (ref 70–99)
HBV SURFACE AB SER-ACNC: SIGNIFICANT CHANGE UP
HCT VFR BLD CALC: 41.4 % — SIGNIFICANT CHANGE UP (ref 34.5–45)
HGB BLD-MCNC: 12.6 G/DL — SIGNIFICANT CHANGE UP (ref 11.5–15.5)
INR BLD: 1.1 — SIGNIFICANT CHANGE UP (ref 0.88–1.16)
IRON SATN MFR SERPL: 19 % — SIGNIFICANT CHANGE UP (ref 14–50)
IRON SATN MFR SERPL: 51 UG/DL — SIGNIFICANT CHANGE UP (ref 30–160)
IRON SATN MFR SERPL: 51 UG/DL — SIGNIFICANT CHANGE UP (ref 30–160)
LACTATE SERPL-SCNC: 1.7 MMOL/L — SIGNIFICANT CHANGE UP (ref 0.5–2)
MAGNESIUM SERPL-MCNC: 2.4 MG/DL — SIGNIFICANT CHANGE UP (ref 1.6–2.6)
MCHC RBC-ENTMCNC: 27.6 PG — SIGNIFICANT CHANGE UP (ref 27–34)
MCHC RBC-ENTMCNC: 30.4 GM/DL — LOW (ref 32–36)
MCV RBC AUTO: 90.8 FL — SIGNIFICANT CHANGE UP (ref 80–100)
MYOGLOBIN UR-MCNC: 2678 MCG/L — HIGH
NRBC # BLD: 0 /100 WBCS — SIGNIFICANT CHANGE UP (ref 0–0)
PHOSPHATE SERPL-MCNC: 2.5 MG/DL — SIGNIFICANT CHANGE UP (ref 2.5–4.5)
PLATELET # BLD AUTO: 238 K/UL — SIGNIFICANT CHANGE UP (ref 150–400)
POTASSIUM SERPL-MCNC: 3.3 MMOL/L — LOW (ref 3.5–5.3)
POTASSIUM SERPL-SCNC: 3.3 MMOL/L — LOW (ref 3.5–5.3)
PROT SERPL-MCNC: 6.6 G/DL — SIGNIFICANT CHANGE UP (ref 6–8.3)
PROTHROM AB SERPL-ACNC: 12.5 SEC — SIGNIFICANT CHANGE UP (ref 10–12.9)
RBC # BLD: 4.56 M/UL — SIGNIFICANT CHANGE UP (ref 3.8–5.2)
RBC # FLD: 13.9 % — SIGNIFICANT CHANGE UP (ref 10.3–14.5)
SODIUM SERPL-SCNC: 139 MMOL/L — SIGNIFICANT CHANGE UP (ref 135–145)
SPECIMEN SOURCE: SIGNIFICANT CHANGE UP
TIBC SERPL-MCNC: 269 UG/DL — SIGNIFICANT CHANGE UP (ref 220–430)
TRANSFERRIN SERPL-MCNC: 196 MG/DL — LOW (ref 200–360)
UIBC SERPL-MCNC: 218 UG/DL — SIGNIFICANT CHANGE UP (ref 110–370)
WBC # BLD: 10.26 K/UL — SIGNIFICANT CHANGE UP (ref 3.8–10.5)
WBC # FLD AUTO: 10.26 K/UL — SIGNIFICANT CHANGE UP (ref 3.8–10.5)

## 2019-11-15 PROCEDURE — 93010 ELECTROCARDIOGRAM REPORT: CPT

## 2019-11-15 PROCEDURE — 99232 SBSQ HOSP IP/OBS MODERATE 35: CPT

## 2019-11-15 PROCEDURE — 99233 SBSQ HOSP IP/OBS HIGH 50: CPT | Mod: GC

## 2019-11-15 PROCEDURE — 76705 ECHO EXAM OF ABDOMEN: CPT | Mod: 26

## 2019-11-15 RX ORDER — ACETAMINOPHEN 500 MG
650 TABLET ORAL ONCE
Refills: 0 | Status: COMPLETED | OUTPATIENT
Start: 2019-11-15 | End: 2019-11-15

## 2019-11-15 RX ORDER — POTASSIUM CHLORIDE 20 MEQ
40 PACKET (EA) ORAL EVERY 4 HOURS
Refills: 0 | Status: COMPLETED | OUTPATIENT
Start: 2019-11-15 | End: 2019-11-15

## 2019-11-15 RX ADMIN — CEFTRIAXONE 100 MILLIGRAM(S): 500 INJECTION, POWDER, FOR SOLUTION INTRAMUSCULAR; INTRAVENOUS at 19:08

## 2019-11-15 RX ADMIN — Medication 2: at 19:08

## 2019-11-15 RX ADMIN — Medication 40 MILLIEQUIVALENT(S): at 12:33

## 2019-11-15 RX ADMIN — Medication 100 MILLIGRAM(S): at 19:42

## 2019-11-15 RX ADMIN — Medication 100 MILLIGRAM(S): at 00:35

## 2019-11-15 RX ADMIN — Medication 650 MILLIGRAM(S): at 12:32

## 2019-11-15 RX ADMIN — Medication 100 MILLIGRAM(S): at 09:29

## 2019-11-15 RX ADMIN — PANTOPRAZOLE SODIUM 40 MILLIGRAM(S): 20 TABLET, DELAYED RELEASE ORAL at 07:06

## 2019-11-15 RX ADMIN — Medication 650 MILLIGRAM(S): at 13:00

## 2019-11-15 RX ADMIN — Medication 40 MILLIEQUIVALENT(S): at 09:29

## 2019-11-15 NOTE — PROGRESS NOTE ADULT - SUBJECTIVE AND OBJECTIVE BOX
GASTROENTEROLOGY PROGRESS NOTE  Patient seen and examined at bedside. Patient still complaining of LLQ abdominal pain which she does not believe has changed. Feels she has not had a good BM recently. Patient without fevers or chills, denies further melena or hematochezia. Patient noted to be undergoing evaluation for vaginal bleeding, but is unable to offer details as to why.    PERTINENT REVIEW OF SYSTEMS:  CONSTITUTIONAL: No weakness, fevers or chills  HEENT: No visual changes; No vertigo or throat pain   GASTROINTESTINAL: As above  NEUROLOGICAL: No numbness or weakness  SKIN: No itching, burning, rashes, or lesions     Allergies    codeine (Other)  Seafood (Other)  Sular (Other)    Intolerances      MEDICATIONS:  MEDICATIONS  (STANDING):  cefTRIAXone   IVPB 1000 milliGRAM(s) IV Intermittent every 24 hours  insulin lispro (HumaLOG) corrective regimen sliding scale   SubCutaneous Before meals and at bedtime  metroNIDAZOLE  IVPB 500 milliGRAM(s) IV Intermittent every 8 hours  pantoprazole    Tablet 40 milliGRAM(s) Oral before breakfast  potassium chloride    Tablet ER 40 milliEquivalent(s) Oral every 4 hours    MEDICATIONS  (PRN):    Vital Signs Last 24 Hrs  T(C): 36.7 (15 Nov 2019 05:36), Max: 37.6 (2019 20:45)  T(F): 98.1 (15 Nov 2019 05:36), Max: 99.7 (2019 20:45)  HR: 115 (15 Nov 2019 05:36) (105 - 115)  BP: 103/70 (15 Nov 2019 05:36) (103/70 - 111/62)  BP(mean): --  RR: 18 (15 Nov 2019 05:36) (18 - 20)  SpO2: 92% (15 Nov 2019 05:36) (88% - 92%)    PHYSICAL EXAM:    General: Well developed; well nourished; in no acute distress  HEENT: MMM, conjunctiva and sclera clear  Gastrointestinal: Soft non-tender non-distended; Normal bowel sounds; No hepatosplenomegaly. No rebound or guarding  Skin: Warm and dry. No obvious rash    LABS:                        12.6   10.26 )-----------( 238      ( 15 Nov 2019 07:14 )             41.4     11-15    139  |  105  |  20  ----------------------------<  116<H>  3.3<L>   |  16<L>  |  2.44<H>    Ca    8.3<L>      15 Nov 2019 07:14  Phos  2.5     11-15  Mg     2.4     11-15    TPro  6.6  /  Alb  3.1<L>  /  TBili  0.5  /  DBili  x   /  AST  127<H>  /  ALT  88<H>  /  AlkPhos  84  11-15    PT/INR - ( 15 Nov 2019 07:14 )   PT: 12.5 sec;   INR: 1.10          PTT - ( 15 Nov 2019 07:14 )  PTT:25.4 sec      Urinalysis Basic - ( 2019 01:21 )    Color: Yellow / Appearance: Clear / S.020 / pH: x  Gluc: x / Ketone: NEGATIVE  / Bili: Negative / Urobili: 0.2 E.U./dL   Blood: x / Protein: 100 mg/dL / Nitrite: NEGATIVE   Leuk Esterase: NEGATIVE / RBC: < 5 /HPF / WBC < 5 /HPF   Sq Epi: x / Non Sq Epi: 0-5 /HPF / Bacteria: Present /HPF                RADIOLOGY & ADDITIONAL STUDIES:  Reviewed

## 2019-11-15 NOTE — PROGRESS NOTE ADULT - PROBLEM SELECTOR PLAN 1
Sepsis 2/2 to colitis: On admission, pt tachycardic 110s, WBC 11, Bands 17 3/4 SIRS. Appropriate fluid resuscitation 2L  Pt with hematochezia and LLQ abd pain, found to have CT a/p recto-sigmoid thickness concerning for colitis. Lactate neg but WBC 11, Bands 17, resolved today   - f/u GI recs  - C/w CFTZ 1 Gm qd /flagyl 500mg TID  - Pending stool studies, GI/PCR - no BM yet Sepsis 2/2 to colitis: On admission, pt tachycardic 110s, WBC 11, Bands 17 3/4 SIRS. Appropriate fluid resuscitation 2L  Pt with hematochezia and LLQ abd pain, found to have CT a/p recto-sigmoid thickness concerning for colitis. Lactate neg but WBC 11, Bands 17, resolved today   - f/u GI recs  - C/w CFTZ 1 Gm qd /flagyl 500mg TID  - Pending stool studies, GI/PCR - no BM yet  - f/u lactate to rule out suspicion for ischemic bowel

## 2019-11-15 NOTE — PROGRESS NOTE ADULT - ASSESSMENT
78yo F with a PMHx of HLD, dementia (reportedly A&Ox1 at baseline), unspecified cardiomyopathy, and GERD who was sent in by per PMD after evaluation for bright red blood per rectum. GI consulted for rectal bleeding.    1. Rectal bleeding - Patient reportedly with multiple episodes of BRBPR noted in her diaper, no reported overt melena or hematochezia. Story of straining with passage of stool suggestive of hemorrhoidal bleeding, however, bandemia and tachycardia meeting SIRS criteria with LLQ pain and diarrhea concerning for diverticulitis/diverticular etiology of bleeding. Pain at time of BM concerning for ischemic colitis, though lactate negative.   - Continue Ceftriaxone/Flagyl for colitis/diverticulitis  - f/u GI PCR, Stool Culture, C diff  - Maintain active T&S, large bore IV access  - Serial Hgb/Hct  - Transfusion threshold per primary team    2. Elevated LTs - Patient with elevated LTs on presentation, , ALT 70, normal bilirubin and alk phos suggestive of hepatocellular pattern. No reported history of liver disease, daughter denies EtOH history. Tylenol noted as home medication. No INR/PT prolongation noted on initial labs. Acetaminophen and Tylenol levels wnl.  - Obtain outpatient labs for collateral  - Complete abdominal US w/doppler  - Medications reviewed on [employer] Liver[employer]ox database: Acetaminophen class A, Aspirin class A, Rosuvastatin class B, Omeprazole class B, Zolpidem class E, Trazodone rare (letters corresponding to likelihood of etiology of liver injury)  - f/u lipid panel, A1c, Iron studies, Ceruloplasmin, Alpha 1 Antitrypsin, Hepatitis B and C serologies, KAJAL, ASMA, AMA, Quantitative IgG  - Daily CMP and Coags  - UA with large blood but low RBCs with elevated Cr, may be element of rhabdomyolysis which can increase liver enzymes - obtain CK    Recommendations discussed with primary team  Case discussed with attending physician

## 2019-11-15 NOTE — PROGRESS NOTE ADULT - SUBJECTIVE AND OBJECTIVE BOX
SUBJECTIVE:  Patient seen and examined at bedside. She appears comfortable and in no acute distress. She does know her name but again cannot recall where she is and why. She says that she does not have any pain and had no bowel movements since being on the floor. She denies fevers, chills, chest pain, shortness of breath, abdominal pain, nausea, and vomiting.     Vital Signs Last 12 Hrs  T(F): 98.2 (11-15-19 @ 12:30), Max: 98.2 (11-15-19 @ 12:30)  HR: 111 (11-15-19 @ 12:30) (111 - 115)  BP: 101/67 (11-15-19 @ 12:30) (101/67 - 103/70)  BP(mean): --  RR: 18 (11-15-19 @ 12:30) (18 - 18)  SpO2: 94% (11-15-19 @ 12:30) (92% - 94%)  I&O's Summary      PHYSICAL EXAM:  Constitutional: NAD, comfortable in bed.  HEENT: NC/AT, PERRLA, EOMI, no conjunctival pallor or scleral icterus, MMM  Neck: Supple, no JVD  Respiratory: CTA B/L. No w/r/r.   Cardiovascular: RRR, normal S1 and S2, no m/r/g.   Gastrointestinal: +BS, difusely tender on deep palpation on exam, nondistended, no guarding or rebound tenderness, no palpable masses   Extremities: wwp; no cyanosis, clubbing or edema.   Vascular: Pulses equal and strong throughout.   Neurological: AAOx1, no CN deficits, strength and sensation intact throughout.   Skin: No gross skin abnormalities or rashes        LABS:                        12.6   10.26 )-----------( 238      ( 15 Nov 2019 07:14 )             41.4     11-15    139  |  105  |  20  ----------------------------<  116<H>  3.3<L>   |  16<L>  |  2.44<H>    Ca    8.3<L>      15 Nov 2019 07:14  Phos  2.5     -15  Mg     2.4     -15    TPro  6.6  /  Alb  3.1<L>  /  TBili  0.5  /  DBili  x   /  AST  127<H>  /  ALT  88<H>  /  AlkPhos  84  11-15    PT/INR - ( 15 Nov 2019 07:14 )   PT: 12.5 sec;   INR: 1.10          PTT - ( 15 Nov 2019 07:14 )  PTT:25.4 sec  Urinalysis Basic - ( 2019 01:21 )    Color: Yellow / Appearance: Clear / S.020 / pH: x  Gluc: x / Ketone: NEGATIVE  / Bili: Negative / Urobili: 0.2 E.U./dL   Blood: x / Protein: 100 mg/dL / Nitrite: NEGATIVE   Leuk Esterase: NEGATIVE / RBC: < 5 /HPF / WBC < 5 /HPF   Sq Epi: x / Non Sq Epi: 0-5 /HPF / Bacteria: Present /HPF          RADIOLOGY & ADDITIONAL TESTS:    MEDICATIONS  (STANDING):  cefTRIAXone   IVPB 1000 milliGRAM(s) IV Intermittent every 24 hours  insulin lispro (HumaLOG) corrective regimen sliding scale   SubCutaneous Before meals and at bedtime  metroNIDAZOLE  IVPB 500 milliGRAM(s) IV Intermittent every 8 hours  pantoprazole    Tablet 40 milliGRAM(s) Oral before breakfast    MEDICATIONS  (PRN): SUBJECTIVE:  Patient seen and examined at bedside. She appears comfortable and in no acute distress. She does know her name but again cannot recall where she is and why. She says that she does not have any pain and had no bowel movements since being on the floor. She says she is having a dull, 5/10 headache localized in the frontal lobe with no symptoms of nausea or blurry vision. She is mentating well. Her daughters at bedside say that she has frequent headaches and follows with a neurologist who recommends Tylenol Otherwise, she denies fevers, chills, chest pain, shortness of breath, abdominal pain, nausea, and vomiting.     Vital Signs Last 12 Hrs  T(F): 98.2 (11-15-19 @ 12:30), Max: 98.2 (11-15-19 @ 12:30)  HR: 111 (11-15-19 @ 12:30) (111 - 115)  BP: 101/67 (11-15-19 @ 12:30) (101/67 - 103/70)  BP(mean): --  RR: 18 (11-15-19 @ 12:30) (18 - 18)  SpO2: 94% (11-15-19 @ 12:30) (92% - 94%)  I&O's Summary      PHYSICAL EXAM:  Constitutional: NAD, comfortable in bed.  HEENT: NC/AT, PERRLA, EOMI, no conjunctival pallor or scleral icterus, MMM  Neck: Supple, no JVD  Respiratory: CTA B/L. No w/r/r.   Cardiovascular: RRR, normal S1 and S2, no m/r/g.   Gastrointestinal: +BS, difusely tender on deep palpation on exam, nondistended, no guarding or rebound tenderness, no palpable masses   Extremities: wwp; no cyanosis, clubbing or edema.   Vascular: Pulses equal and strong throughout.   Neurological: AAOx1, no CN deficits, strength and sensation intact throughout.   Skin: No gross skin abnormalities or rashes        LABS:                        12.6   10.26 )-----------( 238      ( 15 Nov 2019 07:14 )             41.4     11-15    139  |  105  |  20  ----------------------------<  116<H>  3.3<L>   |  16<L>  |  2.44<H>    Ca    8.3<L>      15 Nov 2019 07:14  Phos  2.5     11-15  Mg     2.4     -15    TPro  6.6  /  Alb  3.1<L>  /  TBili  0.5  /  DBili  x   /  AST  127<H>  /  ALT  88<H>  /  AlkPhos  84  11-15    PT/INR - ( 15 Nov 2019 07:14 )   PT: 12.5 sec;   INR: 1.10          PTT - ( 15 Nov 2019 07:14 )  PTT:25.4 sec  Urinalysis Basic - ( 2019 01:21 )    Color: Yellow / Appearance: Clear / S.020 / pH: x  Gluc: x / Ketone: NEGATIVE  / Bili: Negative / Urobili: 0.2 E.U./dL   Blood: x / Protein: 100 mg/dL / Nitrite: NEGATIVE   Leuk Esterase: NEGATIVE / RBC: < 5 /HPF / WBC < 5 /HPF   Sq Epi: x / Non Sq Epi: 0-5 /HPF / Bacteria: Present /HPF          RADIOLOGY & ADDITIONAL TESTS:    MEDICATIONS  (STANDING):  cefTRIAXone   IVPB 1000 milliGRAM(s) IV Intermittent every 24 hours  insulin lispro (HumaLOG) corrective regimen sliding scale   SubCutaneous Before meals and at bedtime  metroNIDAZOLE  IVPB 500 milliGRAM(s) IV Intermittent every 8 hours  pantoprazole    Tablet 40 milliGRAM(s) Oral before breakfast    MEDICATIONS  (PRN):

## 2019-11-15 NOTE — PROGRESS NOTE ADULT - ASSESSMENT
per Internal Medicine    79YOF with PMH of HLD, Vascular Dementia ( A&Ox1), unspecified Cardiomyopathy, and GERD who presents for BRBPR, admitted for hematochezia and diverticulitis.     Problem/Plan - 1:  ·  Problem: Colitis.  Plan: Sepsis 2/2 to colitis: On admission, pt tachycardic 110s, WBC 11, Bands 17 3/4 SIRS. Appropriate fluid resuscitation 2L  Pt with hematochezia and LLQ abd pain, found to have CT a/p recto-sigmoid thickness concerning for colitis. Lactate neg but WBC 11, Bands 17, resolved today   - f/u GI recs  - C/w CFTZ 1 Gm qd /flagyl 500mg TID  - Pending stool studies, GI/PCR - no BM yet  - f/u lactate to rule out suspicion for ischemic bowel.     Problem/Plan - 2:  ·  Problem: Hematochezia.  Plan: Pt with BRBPR possibly 2/2 likely colitis. Hb stable.   - This AM, she was examined to have 2 episodes of blood/mucus clots from her vagina. GYN attributed it to vaginal discharge not concerning for bleeding on external exam. Transvaginal ultrasound ordered and OB service consulted for recs,  - Maintain active Type and screen   - CBCqd  - Monitor BMs, none since admission  - Maintain 2 large bore IVs  - f/u GI/PCR.     Problem/Plan - 3:  ·  Problem: Transaminitis.  Plan: Pt with AST//117 on admission with unknown baseline. Possibly 2/2 chronic Tylenol use (currently holding) vs NAFLD, vs  hepatitis (no cholestatic picture) vs 2/2 sepsis.   - hepatitis panel negative  - salicylate/ acet level nL  - f/u CMP qd  - F/u RUQ u/s  - iron studies show anemia    - as per GI , Ceruloplasmin, Alpha 1 Antitrypsin, Hepatitis B and C serologies, KAJAL, ASMA, AMA, IgG    #asymmetrical RLE edema  Endorses present for a while (no exact time) denies tenderness.   - F/u RLE doppler for r/o dvt.     Problem/Plan - 4:  ·  Problem: Metabolic acidosis.  Plan: Elevated anion gap in setting of decreased PO intake   - f/u B hydroxybutyrate   - encourage PO intake  - calorie count to assess pt intake.     Problem/Plan - 5:  ·  Problem: Vascular dementia without behavioral disturbance.  Plan: PMH of vascular dementia daughter unclear if CVA in the past.   - Baseline AAOx1 HCP daughter  - F/u PT   #AMS/ Stroke R/o   Pt found to be more altered and endorsing severe HA worst in her life s/p stroke code and CT imaging negative for an acute process. Patient likely altered secondary to baseline dementia vs sepsis.   - Currently HA resolved as per patient but as per neurology note if continues, to have severe HA, consider LP to r/o SAH.     Problem/Plan - 6:  Problem: Renal insufficiency. Plan: Pt with Bun/ Cr 25/2.64, unknown baseline, likely multifactorial renal disease with pre-renal component in setting of diarrhea. S/p fluid repletion.   - F/u BMPqd  - Fena 1.8%, likely intrinsic.    Problem/Plan - 7:  ·  Problem: Cardiomyopathy.  Plan: PMH of cardiomyopathy as per daughter unknown etiology (does not recall if CAD) as she recently moved in with her.  - Obtain collateral from family members   - Holding ASA 325mg and follow up reasoning for this dose vs 81mg, and home Rosuvastatin 20qd holding in setting of transaminitis.     Problem/Plan - 8:  ·  Problem: HLD (hyperlipidemia).  Plan: PMH of HLD on home Rosuvastatin 20qd holding in setting of transaminitis.     Problem/Plan - 9:  ·  Problem: GERD (gastroesophageal reflux disease).  Plan: PMH of GERD on omeprazole 20qd c/w PPI.     Problem/Plan - 10:  Problem: Nutrition, metabolism, and development symptoms. Plan; F: None   E: Replete for K<4 Mag<2  N: DASH/TLC diet  A: None now in setting of GIB, SCDs  FULL CODE  Dispo:  [facility].

## 2019-11-15 NOTE — PROGRESS NOTE ADULT - PROBLEM SELECTOR PLAN 7
PMH of HLD on home Rosuvastatin 20qd holding in setting of transaminitis PMH of cardiomyopathy as per daughter unknown etiology (does not recall if CAD) as she recently moved in with her.  - Obtain collateral from family members   - Holding ASA 325mg and follow up reasoning for this dose vs 81mg, and home Rosuvastatin 20qd holding in setting of transaminitis

## 2019-11-15 NOTE — PROGRESS NOTE ADULT - PROBLEM SELECTOR PLAN 8
PMH of GERD on omeprazole 20qd c/w PPI PMH of HLD on home Rosuvastatin 20qd holding in setting of transaminitis

## 2019-11-15 NOTE — PROGRESS NOTE ADULT - PROBLEM SELECTOR PLAN 10
1) PCP Contacted on Admission: (Y/N) --> Name & Phone #:  2) Date of Contact with PCP:  3) PCP Contacted at Discharge: (Y/N, N/A)  4) Summary of Handoff Given to PCP:   5) Post-Discharge Appointment Date and Location: F: None   E: Replete for K<4 Mag<2  N: DASH/TLC diet  A: None now in setting of GIB, SCDs  FULL CODE  Dispo:  [facility]

## 2019-11-15 NOTE — CONSULT NOTE ADULT - SUBJECTIVE AND OBJECTIVE BOX
80yo F with PMHx of HLD, Vascular Dementia ( A&Ox1), unspecified Cardiomyopathy, and GERD admitted for medicine for BRBPR. GYN consulted for vaginal bleeding. Pt states she has "fluid coming out from under" and does not think she is bleeding from her vagina. Pt last her period "years ago" and states she had her uterus, tubes, and ovaries removed 6-7 years ago. She does not recall where she had the surgery but knows the surgery was done because she had fibroids and heavy vaginal bleeding. Pt states she had a hx of breast cancer and had bilateral breast mastectomy years ago.   Pt denies fever, chills, chest pain, SOB, nausea, or vomiting.    OB/GYN Hx:    x4   Hx of fibroids   Does not recall last PAP smear   PMHx: See above   SHx: Mastectomy, hysterectomy?     Allergies: Codeine, Sular, Seafood     PHYSICAL EXAM:   Vital Signs Last 24 Hrs  T(C): 36.8 (15 Nov 2019 12:30), Max: 37.6 (2019 20:45)  T(F): 98.2 (15 Nov 2019 12:30), Max: 99.7 (2019 20:45)  HR: 111 (15 Nov 2019 12:30) (105 - 115)  BP: 101/67 (15 Nov 2019 12:30) (101/67 - 109/70)  BP(mean): --  RR: 18 (15 Nov 2019 12:30) (18 - 20)  SpO2: 94% (15 Nov 2019 12:30) (91% - 94%)    **************************  Constitutional: Alert & Oriented x3, No acute distress  Respiratory: Clear to ausculation bilaterally  Cardiovascular: regular rate and rhythm  Gastrointestinal: soft, nontender, positive bowel sounds, no rebound or guarding   Pelvic exam: normal external genitalia, no blood in vaginal vault   Extremities: no calf tenderness or swelling    LABS:                        12.6   10.26 )-----------( 238      ( 15 Nov 2019 07:14 )             41.4     11-15    139  |  105  |  20  ----------------------------<  116<H>  3.3<L>   |  16<L>  |  2.44<H>    Ca    8.3<L>      15 Nov 2019 07:14  Phos  2.5     -15  Mg     2.4     11-15    TPro  6.6  /  Alb  3.1<L>  /  TBili  0.5  /  DBili  x   /  AST  127<H>  /  ALT  88<H>  /  AlkPhos  84  11-15    PT/INR - ( 15 Nov 2019 07:14 )   PT: 12.5 sec;   INR: 1.10          PTT - ( 15 Nov 2019 07:14 )  PTT:25.4 sec  Urinalysis Basic - ( 2019 01:21 )    Color: Yellow / Appearance: Clear / S.020 / pH: x  Gluc: x / Ketone: NEGATIVE  / Bili: Negative / Urobili: 0.2 E.U./dL   Blood: x / Protein: 100 mg/dL / Nitrite: NEGATIVE   Leuk Esterase: NEGATIVE / RBC: < 5 /HPF / WBC < 5 /HPF   Sq Epi: x / Non Sq Epi: 0-5 /HPF / Bacteria: Present /HPF    RADIOLOGY & ADDITIONAL STUDIES:

## 2019-11-15 NOTE — PROGRESS NOTE ADULT - PROBLEM SELECTOR PLAN 3
Pt with AST//117 on admission with unknown baseline. Possibly 2/2 chronic Tylenol use (currently holding) vs NAFLD, vs  hepatitis (no cholestatic picture) vs 2/2 sepsis.   - hepatitis panel negative  - salicylate/ acet level nL  - f/u CMP qd  - F/u RUQ u/s  - iron studies show anemia    - as per GI , Ceruloplasmin, Alpha 1 Antitrypsin, Hepatitis B and C serologies, KAJAL, ASMA, AMA, IgG    #asymmetrical RLE edema  Endorses present for a while (no exact time) denies tenderness.   - F/u RLE doppler for r/o dvt

## 2019-11-15 NOTE — PROGRESS NOTE ADULT - PROBLEM SELECTOR PLAN 5
Pt with Bun/ Cr 25/2.64, unknown baseline, likely multifactorial renal disease with pre-renal component in setting of diarrhea. S/p fluid repletion.   - F/u BMPqd  - Fena 1.8%, likely intrinsic PMH of vascular dementia daughter unclear if CVA in the past.   - Baseline AAOx1 HCP daughter  - F/u PT   #AMS/ Stroke R/o   Pt found to be more altered and endorsing severe HA worst in her life s/p stroke code and CT imaging negative for an acute process. Patient likely altered secondary to baseline dementia vs sepsis.   - Currently HA resolved as per patient but as per neurology note if continues, to have severe HA, consider LP to r/o SAH

## 2019-11-15 NOTE — CONSULT NOTE ADULT - ASSESSMENT
78yo F with PMHx of HLD, Vascular Dementia ( A&Ox1), unspecified Cardiomyopathy, and GERD admitted for medicine for BRBPR. GYN consulted for vaginal bleeding. On vaginal/speculum exam, there was no blood in vaginal vault. Physiologic vaginal discharge was seen. Recommend TVUS. Pt discussed with Dr. Hinds.

## 2019-11-15 NOTE — PROGRESS NOTE ADULT - PROBLEM SELECTOR PLAN 4
PMH of vascular dementia daughter unclear if CVA in the past.   - Baseline AAOx1 HCP daughter  - F/u PT   #AMS/ Stroke R/o   Pt found to be more altered and endorsing severe HA worst in her life s/p stroke code and CT imaging negative for an acute process. Patient likely altered secondary to baseline dementia vs sepsis.   - Currently HA resolved as per patient but as per neurology note if continues, to have severe HA, consider LP to r/o SAH Elevated anion gap in setting of decreased PO intake   - f/u B hydroxybutyrate   - encourage PO intake  - calorie count to assess pt intake.

## 2019-11-15 NOTE — PROGRESS NOTE ADULT - SUBJECTIVE AND OBJECTIVE BOX
Physical Medicine and Rehabilitation Progress Note:    Patient is a 79y old  Female who presents with a chief complaint of hematochezia (15 Nov 2019 15:30)      HPI:  79YOF with PMH of HLD, Vascular Dementia ( A&Ox1), unspecified Cardiomyopathy, and GERD who presents for BRBPR. As per daughter on the phone, patient patient had been having constipation for a couple of days which led to straining with subsequent diarrhea w/ BRBPR which started as spotting and then to more sean bleeding (atleast 3 bouts). Pt endorsed to her daughter LLQ pain as well but denied any hemat(emesis), sick contacts, fever or chills at home, or prior episodes of BRBPR. Pt had C-scope years ago but daughter could not recall findings. In the ambulance, patient found to be altered and endorsing severe HA (worst headache of her life), therefore stroke code was called with negative imaging. GI consulted in ED s/p CTa/p with evidence of recto-sig thickening, recommending treatment for diverticulitis as well as workup for transaminitis. Pt seen at ED bedside at 9pm endorsing that HA has resolved, denies any blurry vision, new motor or sensation changes, and endorses no abd. pain at rest (just tenderness to palpation), and denies any nausea, (last BM had spotting of blood only once since admission).   At ED vitals:  afebrile, HR 110s, -140/70s, 90--. 95% 2L NC  Labs s/f: WBC 11, Bands 17, Lact 1.8, Hb 13.6, Bun/ Cr 25/2.64, AST//117    Pt given: CFTZ/flagyl, zofran 4 IVP, 2L NS (13 Nov 2019 22:36)                            12.6   10.26 )-----------( 238      ( 15 Nov 2019 07:14 )             41.4       11-15    139  |  105  |  20  ----------------------------<  116<H>  3.3<L>   |  16<L>  |  2.44<H>    Ca    8.3<L>      15 Nov 2019 07:14  Phos  2.5     11-15  Mg     2.4     11-15    TPro  6.6  /  Alb  3.1<L>  /  TBili  0.5  /  DBili  x   /  AST  127<H>  /  ALT  88<H>  /  AlkPhos  84  11-15    Vital Signs Last 24 Hrs  T(C): 36.8 (15 Nov 2019 12:30), Max: 37.6 (14 Nov 2019 20:45)  T(F): 98.2 (15 Nov 2019 12:30), Max: 99.7 (14 Nov 2019 20:45)  HR: 111 (15 Nov 2019 12:30) (105 - 115)  BP: 101/67 (15 Nov 2019 12:30) (101/67 - 109/70)  BP(mean): --  RR: 18 (15 Nov 2019 12:30) (18 - 20)  SpO2: 94% (15 Nov 2019 12:30) (91% - 94%)    MEDICATIONS  (STANDING):  cefTRIAXone   IVPB 1000 milliGRAM(s) IV Intermittent every 24 hours  insulin lispro (HumaLOG) corrective regimen sliding scale   SubCutaneous Before meals and at bedtime  metroNIDAZOLE  IVPB 500 milliGRAM(s) IV Intermittent every 8 hours  pantoprazole    Tablet 40 milliGRAM(s) Oral before breakfast    MEDICATIONS  (PRN):    Currently Undergoing Physical Therapy at bedside.    Functional Status Assessment:    Therapeutic Interventions      Bed Mobility  Bed Mobility Training Rolling/Turning: stand-by assist;  nonverbal cues (demo/gestures);  1 person assist;  verbal cues;  set-up required;  bed rails;  hand held guidance to bed rails to assist with log rolling  Bed Mobility Training Scooting: contact guard;  1 person assist;  nonverbal cues (demo/gestures);  verbal cues;  set-up required;  bed rails;  VC's to place feet on ground  Bed Mobility Training Sit-to-Supine: contact guard;  1 person assist;  nonverbal cues (demo/gestures);  verbal cues;  set-up required;  bed rails;  assist for LE's  Bed Mobility Training Supine-to-Sit: minimum assist (75% patient effort);  1 person assist;  nonverbal cues (demo/gestures);  verbal cues;  set-up required;  bed rails;  HOB elevated, VC's for UE use  Bed Mobility Training Limitations: decreased ability to use legs for bridging/pushing;  impaired ability to control trunk for mobility;  decreased ability to use arms for pushing/pulling;  decreased strength;  impaired balance;  pain;  impaired postural control    Sit-Stand Transfer Training  Transfer Training Sit-to-Stand Transfer: contact guard;  1 person assist;  nonverbal cues (demo/gestures);  verbal cues;  set-up required;  full weight-bearing   rolling walker;  bed slightly elevated  Transfer Training Stand-to-Sit Transfer: contact guard;  1 person assist;  nonverbal cues (demo/gestures);  verbal cues;  set-up required;  full weight-bearing   rolling walker    Gait Training  Gait Training: TBA    Stair Training  Physical Assist/Nonphysical Assist: TBA    Therapeutic Exercise  Therapeutic Exercise Detail: Tolerated sitting EOB, VC's for upright posture and breathing - ~3-5 minutesTolerated standing with CG and RW, VC's for posture and breathing - ~15 seconds limited by pain         PM&R Impression: as above    Current Disposition Plan Recommendations: subacute rehab placement

## 2019-11-15 NOTE — PROGRESS NOTE ADULT - PROBLEM SELECTOR PLAN 2
Pt with BRBPR possibly 2/2 likely colitis. Hb stable.   - This AM, she was examined to have 2 episodes of blood/mucus clots from her vagina. GYN attributed it to vaginal discharge not concerning for bleeding on external exam. Transvaginal ultrasound ordered and OB service consulted for recs,  - Maintain active Type and screen   - CBCqd  - Monitor BMs, none since admission  - Maintain 2 large bore IVs  - f/u GI/PCR

## 2019-11-15 NOTE — PROGRESS NOTE ADULT - PROBLEM SELECTOR PLAN 6
PMH of cardiomyopathy as per daughter unknown etiology (does not recall if CAD) as she recently moved in with her.  - Obtain collateral from family members   - Holding ASA 325mg and follow up reasoning for this dose vs 81mg, and home Rosuvastatin 20qd holding in setting of transaminitis Pt with Bun/ Cr 25/2.64, unknown baseline, likely multifactorial renal disease with pre-renal component in setting of diarrhea. S/p fluid repletion.   - F/u BMPqd  - Fena 1.8%, likely intrinsic

## 2019-11-16 LAB
ALBUMIN SERPL ELPH-MCNC: 3 G/DL — LOW (ref 3.3–5)
ALP SERPL-CCNC: 77 U/L — SIGNIFICANT CHANGE UP (ref 40–120)
ALT FLD-CCNC: 66 U/L — HIGH (ref 10–45)
ANION GAP SERPL CALC-SCNC: 14 MMOL/L — SIGNIFICANT CHANGE UP (ref 5–17)
APTT BLD: 131.2 SEC — CRITICAL HIGH (ref 27.5–36.3)
APTT BLD: 28.1 SEC — SIGNIFICANT CHANGE UP (ref 27.5–36.3)
AST SERPL-CCNC: 88 U/L — HIGH (ref 10–40)
BILIRUB SERPL-MCNC: 0.3 MG/DL — SIGNIFICANT CHANGE UP (ref 0.2–1.2)
BUN SERPL-MCNC: 20 MG/DL — SIGNIFICANT CHANGE UP (ref 7–23)
CALCIUM SERPL-MCNC: 8 MG/DL — LOW (ref 8.4–10.5)
CHLORIDE SERPL-SCNC: 109 MMOL/L — HIGH (ref 96–108)
CO2 SERPL-SCNC: 15 MMOL/L — LOW (ref 22–31)
CREAT SERPL-MCNC: 2.43 MG/DL — HIGH (ref 0.5–1.3)
GLUCOSE BLDC GLUCOMTR-MCNC: 124 MG/DL — HIGH (ref 70–99)
GLUCOSE BLDC GLUCOMTR-MCNC: 156 MG/DL — HIGH (ref 70–99)
GLUCOSE BLDC GLUCOMTR-MCNC: 163 MG/DL — HIGH (ref 70–99)
GLUCOSE BLDC GLUCOMTR-MCNC: 84 MG/DL — SIGNIFICANT CHANGE UP (ref 70–99)
GLUCOSE SERPL-MCNC: 139 MG/DL — HIGH (ref 70–99)
HCT VFR BLD CALC: 36.9 % — SIGNIFICANT CHANGE UP (ref 34.5–45)
HGB BLD-MCNC: 12 G/DL — SIGNIFICANT CHANGE UP (ref 11.5–15.5)
INR BLD: 1.22 — HIGH (ref 0.88–1.16)
MAGNESIUM SERPL-MCNC: 2.1 MG/DL — SIGNIFICANT CHANGE UP (ref 1.6–2.6)
MCHC RBC-ENTMCNC: 28.5 PG — SIGNIFICANT CHANGE UP (ref 27–34)
MCHC RBC-ENTMCNC: 32.5 GM/DL — SIGNIFICANT CHANGE UP (ref 32–36)
MCV RBC AUTO: 87.6 FL — SIGNIFICANT CHANGE UP (ref 80–100)
NRBC # BLD: 0 /100 WBCS — SIGNIFICANT CHANGE UP (ref 0–0)
PHOSPHATE SERPL-MCNC: 1.8 MG/DL — LOW (ref 2.5–4.5)
PLATELET # BLD AUTO: 204 K/UL — SIGNIFICANT CHANGE UP (ref 150–400)
POTASSIUM SERPL-MCNC: 3.6 MMOL/L — SIGNIFICANT CHANGE UP (ref 3.5–5.3)
POTASSIUM SERPL-SCNC: 3.6 MMOL/L — SIGNIFICANT CHANGE UP (ref 3.5–5.3)
PROT SERPL-MCNC: 6.3 G/DL — SIGNIFICANT CHANGE UP (ref 6–8.3)
PROTHROM AB SERPL-ACNC: 13.9 SEC — HIGH (ref 10–12.9)
RBC # BLD: 4.21 M/UL — SIGNIFICANT CHANGE UP (ref 3.8–5.2)
RBC # FLD: 14.3 % — SIGNIFICANT CHANGE UP (ref 10.3–14.5)
SODIUM SERPL-SCNC: 138 MMOL/L — SIGNIFICANT CHANGE UP (ref 135–145)
WBC # BLD: 11.41 K/UL — HIGH (ref 3.8–10.5)
WBC # FLD AUTO: 11.41 K/UL — HIGH (ref 3.8–10.5)

## 2019-11-16 PROCEDURE — 76856 US EXAM PELVIC COMPLETE: CPT | Mod: 26,59

## 2019-11-16 PROCEDURE — 99233 SBSQ HOSP IP/OBS HIGH 50: CPT | Mod: GC

## 2019-11-16 PROCEDURE — 93971 EXTREMITY STUDY: CPT | Mod: 26,RT

## 2019-11-16 PROCEDURE — 93010 ELECTROCARDIOGRAM REPORT: CPT

## 2019-11-16 RX ORDER — HEPARIN SODIUM 5000 [USP'U]/ML
1200 INJECTION INTRAVENOUS; SUBCUTANEOUS
Qty: 25000 | Refills: 0 | Status: DISCONTINUED | OUTPATIENT
Start: 2019-11-16 | End: 2019-11-16

## 2019-11-16 RX ORDER — SODIUM CHLORIDE 9 MG/ML
1000 INJECTION INTRAMUSCULAR; INTRAVENOUS; SUBCUTANEOUS
Refills: 0 | Status: DISCONTINUED | OUTPATIENT
Start: 2019-11-16 | End: 2019-11-16

## 2019-11-16 RX ORDER — SODIUM CHLORIDE 9 MG/ML
250 INJECTION INTRAMUSCULAR; INTRAVENOUS; SUBCUTANEOUS ONCE
Refills: 0 | Status: COMPLETED | OUTPATIENT
Start: 2019-11-16 | End: 2019-11-16

## 2019-11-16 RX ORDER — WARFARIN SODIUM 2.5 MG/1
5 TABLET ORAL DAILY
Refills: 0 | Status: COMPLETED | OUTPATIENT
Start: 2019-11-16 | End: 2019-11-16

## 2019-11-16 RX ORDER — POTASSIUM PHOSPHATE, MONOBASIC POTASSIUM PHOSPHATE, DIBASIC 236; 224 MG/ML; MG/ML
15 INJECTION, SOLUTION INTRAVENOUS ONCE
Refills: 0 | Status: COMPLETED | OUTPATIENT
Start: 2019-11-16 | End: 2019-11-16

## 2019-11-16 RX ORDER — ACETAMINOPHEN 500 MG
650 TABLET ORAL ONCE
Refills: 0 | Status: DISCONTINUED | OUTPATIENT
Start: 2019-11-16 | End: 2019-11-16

## 2019-11-16 RX ADMIN — Medication 2: at 12:41

## 2019-11-16 RX ADMIN — PANTOPRAZOLE SODIUM 40 MILLIGRAM(S): 20 TABLET, DELAYED RELEASE ORAL at 07:01

## 2019-11-16 RX ADMIN — SODIUM CHLORIDE 250 MILLILITER(S): 9 INJECTION INTRAMUSCULAR; INTRAVENOUS; SUBCUTANEOUS at 10:35

## 2019-11-16 RX ADMIN — Medication 100 MILLIGRAM(S): at 16:46

## 2019-11-16 RX ADMIN — HEPARIN SODIUM 12 UNIT(S)/HR: 5000 INJECTION INTRAVENOUS; SUBCUTANEOUS at 17:46

## 2019-11-16 RX ADMIN — Medication 2: at 22:21

## 2019-11-16 RX ADMIN — Medication 100 MILLIGRAM(S): at 00:04

## 2019-11-16 RX ADMIN — WARFARIN SODIUM 5 MILLIGRAM(S): 2.5 TABLET ORAL at 21:17

## 2019-11-16 RX ADMIN — CEFTRIAXONE 100 MILLIGRAM(S): 500 INJECTION, POWDER, FOR SOLUTION INTRAMUSCULAR; INTRAVENOUS at 17:22

## 2019-11-16 RX ADMIN — POTASSIUM PHOSPHATE, MONOBASIC POTASSIUM PHOSPHATE, DIBASIC 63.75 MILLIMOLE(S): 236; 224 INJECTION, SOLUTION INTRAVENOUS at 10:35

## 2019-11-16 RX ADMIN — Medication 100 MILLIGRAM(S): at 08:29

## 2019-11-16 NOTE — PROGRESS NOTE ADULT - PROBLEM SELECTOR PLAN 6
Pt with Bun/ Cr 20/2.43, unknown baseline, likely multifactorial renal disease with pre-renal component in setting of diarrhea. S/p fluid repletion.   - F/u BMP daily  - Fena 1.8%, likely intrinsic Pt with Bun/ Cr 20/2.43 today, unknown baseline, likely multifactorial renal disease with pre-renal component in setting of diarrhea. S/p fluid repletion.   - F/u BMP daily  - Fena 1.8%, likely intrinsic

## 2019-11-16 NOTE — PROGRESS NOTE ADULT - SUBJECTIVE AND OBJECTIVE BOX
OVERNIGHT EVENTS:  No acute events overnight.    SUBJECTIVE / INTERVAL HPI: Patient seen and examined at bedside. Pt reports a mild headache this AM. Has not had BM since admission. Denies any complaints of chest pain, SOB, N/V/D.    VITAL SIGNS:  Vital Signs Last 24 Hrs  T(C): 36.9 (16 Nov 2019 05:37), Max: 36.9 (15 Nov 2019 20:51)  T(F): 98.5 (16 Nov 2019 05:37), Max: 98.5 (16 Nov 2019 05:37)  HR: 108 (16 Nov 2019 05:37) (108 - 111)  BP: 99/62 (16 Nov 2019 05:37) (99/62 - 111/69)  BP(mean): --  RR: 18 (16 Nov 2019 05:37) (18 - 18)  SpO2: 96% (16 Nov 2019 05:37) (94% - 96%)    PHYSICAL EXAM:    General: Lying comfortably in bed, NAD  HEENT: NC/AT, PERRL, anicteric sclera; MMM  Neck: supple  Cardiovascular: +S1/S2, RRR  Respiratory: CTA B/L, no W/R/R  Gastrointestinal: soft, diffusely tender to light palpation, +BS  Extremities: WWP, RLE appears slightly larger than LLE  Vascular: 2+ radial, DP/PT pulses B/L  Neurological: AAOx1 to person, no focal deficits    MEDICATIONS:  MEDICATIONS  (STANDING):  acetaminophen   Tablet .. 650 milliGRAM(s) Oral once  cefTRIAXone   IVPB 1000 milliGRAM(s) IV Intermittent every 24 hours  insulin lispro (HumaLOG) corrective regimen sliding scale   SubCutaneous Before meals and at bedtime  metroNIDAZOLE  IVPB 500 milliGRAM(s) IV Intermittent every 8 hours  pantoprazole    Tablet 40 milliGRAM(s) Oral before breakfast  potassium phosphate IVPB 15 milliMole(s) IV Intermittent once    MEDICATIONS  (PRN):      ALLERGIES:  Allergies    codeine (Other)  Seafood (Other)  Sular (Other)    Intolerances        LABS:                        12.0   11.41 )-----------( 204      ( 16 Nov 2019 05:33 )             36.9     11-16    138  |  109<H>  |  20  ----------------------------<  139<H>  3.6   |  15<L>  |  2.43<H>    Ca    8.0<L>      16 Nov 2019 05:33  Phos  1.8     11-16  Mg     2.1     11-16    TPro  6.3  /  Alb  3.0<L>  /  TBili  0.3  /  DBili  x   /  AST  88<H>  /  ALT  66<H>  /  AlkPhos  77  11-16    PT/INR - ( 15 Nov 2019 07:14 )   PT: 12.5 sec;   INR: 1.10          PTT - ( 15 Nov 2019 07:14 )  PTT:25.4 sec    CAPILLARY BLOOD GLUCOSE      POCT Blood Glucose.: 124 mg/dL (16 Nov 2019 08:05)      RADIOLOGY & ADDITIONAL TESTS: Reviewed.

## 2019-11-16 NOTE — PROGRESS NOTE ADULT - PROBLEM SELECTOR PLAN 2
Pt with BRBPR possibly 2/2 likely colitis. Hb 12.0, stable. Pt found to have red discharge from her vagina. GYN attributed it to vaginal discharge not concerning for bleeding on external exam.   - f/u transvaginal ultrasound and further rec's from Ob/Gyn  - Maintain 2 large bore IVs  - Maintain active Type and screen   - CBC daily  - Monitor BMs, none since admission  - f/u GI/PCR

## 2019-11-16 NOTE — PROGRESS NOTE ADULT - PROBLEM SELECTOR PLAN 4
Elevated anion gap in setting of decreased PO intake with poor appetite. Likely component of starvation ketoacidosis given BHB 2.1, anion gap bicarb 15, AG 14  - encourage PO intake  - calorie count to assess pt intake  - Consider starting d5NS Elevated anion gap in setting of decreased PO intake with poor appetite. Likely component of starvation ketoacidosis given BHB 2.1, anion gap bicarb 15, AG 14  - encourage PO intake  - calorie count to assess pt intake  - 250cc bolus NS

## 2019-11-16 NOTE — PROGRESS NOTE ADULT - ASSESSMENT
79YOF with PMH of HLD, Vascular Dementia (A&Ox1), unspecified Cardiomyopathy, and GERD who presents for BRBPR, admitted for hematochezia and diverticulitis.

## 2019-11-16 NOTE — PROGRESS NOTE ADULT - PROBLEM SELECTOR PLAN 5
PMH of vascular dementia daughter unclear if CVA in the past.   - Baseline AAOx1 HCP daughter  - F/u PT     #AMS/ Stroke R/o   Pt found to be more altered and endorsing severe HA worst in her life s/p stroke code and CT imaging negative for an acute process. Patient likely altered secondary to baseline dementia vs sepsis. Today pt endorses mild headache.  - Per neuro if pt continues to have severe HA, consider LP to r/o SAH

## 2019-11-16 NOTE — PROGRESS NOTE ADULT - PROBLEM SELECTOR PLAN 1
On admission, pt with hematochezia and LLQ abd pain. Tachycardic 110s, WBC 11, Bands 17 (3/4 SIRS). CT A/P with recto-sigmoid thickness concerning for colitis. WBC today increased to 11.41 from 10.26   - f/u GI rec's  - C/w ceftriaxone 1g daily and flagyl 500mg TID  - Pending stool studies, GI/PCR - no BM yet

## 2019-11-16 NOTE — PROGRESS NOTE ADULT - PROBLEM SELECTOR PLAN 10
F: None   E: Replete for K<4 Mag<2  N: DASH/TLC diet  A: None now in setting of GIB, SCDs  FULL CODE  Dispo:  [facility] F: None   E: Replete for K<4 Mag<2  N: DASH/TLC diet  A: None now in setting of GIB, SCDs  FULL CODE  Dispo:  [facility], discharge to [facility]

## 2019-11-16 NOTE — PROGRESS NOTE ADULT - PROBLEM SELECTOR PLAN 3
Pt with AST//117 on admission with unknown baseline. Possibly 2/2 chronic Tylenol use (currently holding) vs NAFLD vs hepatitis (no cholestatic picture) vs 2/2 sepsis. Serum alpha-1-AT elevated.  - f/u CMP qd  - F/u RUQ U/S  - f/u KAJAL, ASMA, AMA, IgG    #asymmetrical RLE edema  Endorses present for a while (no exact time) denies tenderness.   - F/u RLE doppler for r/o dvt Pt with AST//117 on admission with unknown baseline. Possibly 2/2 chronic Tylenol use (currently holding) vs NAFLD vs hepatitis (no cholestatic picture) vs 2/2 sepsis. Serum alpha-1-AT elevated. RUQ limited exam but negative for portal vein thrombosis.  - f/u CMP qd  - f/u KAJAL, ASMA, AMA, IgG    #asymmetrical RLE edema  Endorses present for a while (no exact time) denies tenderness.   - F/u RLE doppler for r/o dvt

## 2019-11-17 LAB
ALBUMIN SERPL ELPH-MCNC: 3 G/DL — LOW (ref 3.3–5)
ALBUMIN SERPL ELPH-MCNC: 3.1 G/DL — LOW (ref 3.3–5)
ALP SERPL-CCNC: 72 U/L — SIGNIFICANT CHANGE UP (ref 40–120)
ALP SERPL-CCNC: 75 U/L — SIGNIFICANT CHANGE UP (ref 40–120)
ALT FLD-CCNC: 49 U/L — HIGH (ref 10–45)
ALT FLD-CCNC: 50 U/L — HIGH (ref 10–45)
ANION GAP SERPL CALC-SCNC: 13 MMOL/L — SIGNIFICANT CHANGE UP (ref 5–17)
ANION GAP SERPL CALC-SCNC: 16 MMOL/L — SIGNIFICANT CHANGE UP (ref 5–17)
APTT BLD: 103.4 SEC — HIGH (ref 27.5–36.3)
APTT BLD: 140.5 SEC — CRITICAL HIGH (ref 27.5–36.3)
APTT BLD: 147.6 SEC — CRITICAL HIGH (ref 27.5–36.3)
APTT BLD: >200 SEC — CRITICAL HIGH (ref 27.5–36.3)
AST SERPL-CCNC: 65 U/L — HIGH (ref 10–40)
AST SERPL-CCNC: 67 U/L — HIGH (ref 10–40)
BILIRUB SERPL-MCNC: 0.3 MG/DL — SIGNIFICANT CHANGE UP (ref 0.2–1.2)
BILIRUB SERPL-MCNC: 0.4 MG/DL — SIGNIFICANT CHANGE UP (ref 0.2–1.2)
BUN SERPL-MCNC: 16 MG/DL — SIGNIFICANT CHANGE UP (ref 7–23)
BUN SERPL-MCNC: 17 MG/DL — SIGNIFICANT CHANGE UP (ref 7–23)
CALCIUM SERPL-MCNC: 8.5 MG/DL — SIGNIFICANT CHANGE UP (ref 8.4–10.5)
CALCIUM SERPL-MCNC: 8.5 MG/DL — SIGNIFICANT CHANGE UP (ref 8.4–10.5)
CHLORIDE SERPL-SCNC: 107 MMOL/L — SIGNIFICANT CHANGE UP (ref 96–108)
CHLORIDE SERPL-SCNC: 108 MMOL/L — SIGNIFICANT CHANGE UP (ref 96–108)
CO2 SERPL-SCNC: 16 MMOL/L — LOW (ref 22–31)
CO2 SERPL-SCNC: 17 MMOL/L — LOW (ref 22–31)
CREAT SERPL-MCNC: 2.25 MG/DL — HIGH (ref 0.5–1.3)
CREAT SERPL-MCNC: 2.34 MG/DL — HIGH (ref 0.5–1.3)
GLUCOSE BLDC GLUCOMTR-MCNC: 120 MG/DL — HIGH (ref 70–99)
GLUCOSE BLDC GLUCOMTR-MCNC: 142 MG/DL — HIGH (ref 70–99)
GLUCOSE BLDC GLUCOMTR-MCNC: 160 MG/DL — HIGH (ref 70–99)
GLUCOSE BLDC GLUCOMTR-MCNC: 200 MG/DL — HIGH (ref 70–99)
GLUCOSE SERPL-MCNC: 112 MG/DL — HIGH (ref 70–99)
GLUCOSE SERPL-MCNC: 211 MG/DL — HIGH (ref 70–99)
HCT VFR BLD CALC: 37 % — SIGNIFICANT CHANGE UP (ref 34.5–45)
HGB BLD-MCNC: 11.7 G/DL — SIGNIFICANT CHANGE UP (ref 11.5–15.5)
INR BLD: 1.52 — HIGH (ref 0.88–1.16)
MAGNESIUM SERPL-MCNC: 1.9 MG/DL — SIGNIFICANT CHANGE UP (ref 1.6–2.6)
MCHC RBC-ENTMCNC: 27.4 PG — SIGNIFICANT CHANGE UP (ref 27–34)
MCHC RBC-ENTMCNC: 31.6 GM/DL — LOW (ref 32–36)
MCV RBC AUTO: 86.7 FL — SIGNIFICANT CHANGE UP (ref 80–100)
NRBC # BLD: 0 /100 WBCS — SIGNIFICANT CHANGE UP (ref 0–0)
PLATELET # BLD AUTO: 235 K/UL — SIGNIFICANT CHANGE UP (ref 150–400)
POTASSIUM SERPL-MCNC: 2.8 MMOL/L — CRITICAL LOW (ref 3.5–5.3)
POTASSIUM SERPL-MCNC: 3.6 MMOL/L — SIGNIFICANT CHANGE UP (ref 3.5–5.3)
POTASSIUM SERPL-SCNC: 2.8 MMOL/L — CRITICAL LOW (ref 3.5–5.3)
POTASSIUM SERPL-SCNC: 3.6 MMOL/L — SIGNIFICANT CHANGE UP (ref 3.5–5.3)
PROT SERPL-MCNC: 6.4 G/DL — SIGNIFICANT CHANGE UP (ref 6–8.3)
PROT SERPL-MCNC: 6.8 G/DL — SIGNIFICANT CHANGE UP (ref 6–8.3)
PROTHROM AB SERPL-ACNC: 17.4 SEC — HIGH (ref 10–12.9)
RBC # BLD: 4.27 M/UL — SIGNIFICANT CHANGE UP (ref 3.8–5.2)
RBC # FLD: 14.4 % — SIGNIFICANT CHANGE UP (ref 10.3–14.5)
SODIUM SERPL-SCNC: 137 MMOL/L — SIGNIFICANT CHANGE UP (ref 135–145)
SODIUM SERPL-SCNC: 140 MMOL/L — SIGNIFICANT CHANGE UP (ref 135–145)
WBC # BLD: 11.5 K/UL — HIGH (ref 3.8–10.5)
WBC # FLD AUTO: 11.5 K/UL — HIGH (ref 3.8–10.5)

## 2019-11-17 PROCEDURE — 99233 SBSQ HOSP IP/OBS HIGH 50: CPT | Mod: GC

## 2019-11-17 PROCEDURE — 93010 ELECTROCARDIOGRAM REPORT: CPT

## 2019-11-17 RX ORDER — POTASSIUM CHLORIDE 20 MEQ
40 PACKET (EA) ORAL ONCE
Refills: 0 | Status: COMPLETED | OUTPATIENT
Start: 2019-11-17 | End: 2019-11-17

## 2019-11-17 RX ORDER — MAGNESIUM SULFATE 500 MG/ML
1 VIAL (ML) INJECTION ONCE
Refills: 0 | Status: COMPLETED | OUTPATIENT
Start: 2019-11-17 | End: 2019-11-17

## 2019-11-17 RX ORDER — HEPARIN SODIUM 5000 [USP'U]/ML
1000 INJECTION INTRAVENOUS; SUBCUTANEOUS
Qty: 25000 | Refills: 0 | Status: DISCONTINUED | OUTPATIENT
Start: 2019-11-17 | End: 2019-11-17

## 2019-11-17 RX ORDER — POTASSIUM CHLORIDE 20 MEQ
40 PACKET (EA) ORAL
Refills: 0 | Status: COMPLETED | OUTPATIENT
Start: 2019-11-17 | End: 2019-11-17

## 2019-11-17 RX ORDER — HEPARIN SODIUM 5000 [USP'U]/ML
700 INJECTION INTRAVENOUS; SUBCUTANEOUS
Qty: 25000 | Refills: 0 | Status: DISCONTINUED | OUTPATIENT
Start: 2019-11-17 | End: 2019-11-18

## 2019-11-17 RX ORDER — POTASSIUM CHLORIDE 20 MEQ
40 PACKET (EA) ORAL EVERY 4 HOURS
Refills: 0 | Status: DISCONTINUED | OUTPATIENT
Start: 2019-11-17 | End: 2019-11-17

## 2019-11-17 RX ORDER — HEPARIN SODIUM 5000 [USP'U]/ML
900 INJECTION INTRAVENOUS; SUBCUTANEOUS
Qty: 25000 | Refills: 0 | Status: DISCONTINUED | OUTPATIENT
Start: 2019-11-17 | End: 2019-11-17

## 2019-11-17 RX ORDER — SODIUM CHLORIDE 9 MG/ML
250 INJECTION INTRAMUSCULAR; INTRAVENOUS; SUBCUTANEOUS ONCE
Refills: 0 | Status: COMPLETED | OUTPATIENT
Start: 2019-11-17 | End: 2019-11-17

## 2019-11-17 RX ORDER — WARFARIN SODIUM 2.5 MG/1
3 TABLET ORAL ONCE
Refills: 0 | Status: COMPLETED | OUTPATIENT
Start: 2019-11-17 | End: 2019-11-17

## 2019-11-17 RX ADMIN — Medication 40 MILLIEQUIVALENT(S): at 13:55

## 2019-11-17 RX ADMIN — Medication 100 MILLIGRAM(S): at 10:13

## 2019-11-17 RX ADMIN — PANTOPRAZOLE SODIUM 40 MILLIGRAM(S): 20 TABLET, DELAYED RELEASE ORAL at 06:05

## 2019-11-17 RX ADMIN — HEPARIN SODIUM 10 UNIT(S)/HR: 5000 INJECTION INTRAVENOUS; SUBCUTANEOUS at 01:10

## 2019-11-17 RX ADMIN — CEFTRIAXONE 100 MILLIGRAM(S): 500 INJECTION, POWDER, FOR SOLUTION INTRAMUSCULAR; INTRAVENOUS at 16:45

## 2019-11-17 RX ADMIN — Medication 2: at 17:30

## 2019-11-17 RX ADMIN — HEPARIN SODIUM 7 UNIT(S)/HR: 5000 INJECTION INTRAVENOUS; SUBCUTANEOUS at 17:32

## 2019-11-17 RX ADMIN — Medication 100 MILLIGRAM(S): at 17:15

## 2019-11-17 RX ADMIN — Medication 40 MILLIEQUIVALENT(S): at 06:37

## 2019-11-17 RX ADMIN — Medication 100 GRAM(S): at 07:20

## 2019-11-17 RX ADMIN — WARFARIN SODIUM 3 MILLIGRAM(S): 2.5 TABLET ORAL at 21:22

## 2019-11-17 RX ADMIN — HEPARIN SODIUM 9 UNIT(S)/HR: 5000 INJECTION INTRAVENOUS; SUBCUTANEOUS at 10:13

## 2019-11-17 RX ADMIN — Medication 40 MILLIEQUIVALENT(S): at 16:42

## 2019-11-17 RX ADMIN — Medication 2: at 12:28

## 2019-11-17 RX ADMIN — Medication 100 MILLIGRAM(S): at 00:24

## 2019-11-17 RX ADMIN — Medication 40 MILLIEQUIVALENT(S): at 09:14

## 2019-11-17 NOTE — PROGRESS NOTE ADULT - PROBLEM SELECTOR PLAN 3
Pt with AST//117 on admission with unknown baseline. Possibly 2/2 chronic Tylenol use (currently holding) vs NAFLD vs hepatitis (no cholestatic picture) vs 2/2 sepsis. Serum alpha-1-AT elevated. RUQ limited exam but negative for portal vein thrombosis.  - RUQ u/s - no acute abnormalities  - f/u CMP qd  - f/u KAJAL, ASMA, AMA, IgG    #asymmetrical RLE edema  Endorses present for a while (no exact time) denies tenderness.   - RLE doppler revealed DVT in right femoral vein - on heparin and coumadin  - f/u PTT q4-6 hours

## 2019-11-17 NOTE — PROGRESS NOTE ADULT - PROBLEM SELECTOR PLAN 4
Elevated anion gap in setting of decreased PO intake with poor appetite. Likely component of starvation ketoacidosis given BHB 2.1, anion gap bicarb 15, AG 14  - encourage PO intake  - calorie count to assess pt intake  - 250cc bolus NS

## 2019-11-17 NOTE — PROGRESS NOTE ADULT - ASSESSMENT
78yo F with a PMHx of HLD, dementia (reportedly A&Ox1 at baseline), unspecified cardiomyopathy, and GERD who was sent in by per PMD after evaluation for bright red blood per rectum. GI consulted for rectal bleeding.    1. Rectal bleeding - Patient reportedly with multiple episodes of BRBPR noted in her diaper, no reported overt melena or hematochezia. Story of straining with passage of stool suggestive of hemorrhoidal bleeding, however, bandemia and tachycardia meeting SIRS criteria with LLQ pain and diarrhea concerning for diverticulitis/diverticular etiology of bleeding. Pain at time of BM concerning for ischemic colitis, though lactate negative.   - Continue Ceftriaxone/Flagyl for colitis/diverticulitis  - f/u GI PCR, Stool Culture, C diff  - Maintain active T&S, large bore IV access  - Serial Hgb/Hct  - Transfusion threshold per primary team    2. Elevated LTs - now downtrending, workup thus far negative  Patient with elevated LTs on presentation, , ALT 70, normal bilirubin and alk phos suggestive of hepatocellular pattern. No reported history of liver disease, daughter denies EtOH history. Tylenol noted as home medication. No INR/PT prolongation noted on initial labs. Acetaminophen and Tylenol levels wnl.  - Obtain outpatient labs for collateral  - Complete abdominal US w/doppler- normal liver, patent main portal vein, but right and left not visualized  - Medications reviewed on [facility] LiverTox database: Acetaminophen class A, Aspirin class A, Rosuvastatin class B, Omeprazole class B, Zolpidem class E, Trazodone rare (letters corresponding to likelihood of etiology of liver injury)  -Hep A/B/C neg  -Iron studies not c/w hemochromatosis  - Ceruloplasmin, Alpha 1 Antitrypsin wnl  -Follow up, KAJAL, ASMA, AMA, Quantitative IgG  - Daily CMP and Coags  - UA with large blood but low RBCs with elevated Cr, may be element of rhabdomyolysis which can increase liver enzymes - obtain CK    Recommendations discussed with primary team  Case discussed with attending physician 80yo F with a PMHx of HLD, dementia (reportedly A&Ox1 at baseline), unspecified cardiomyopathy, and GERD who was sent in by per PMD after evaluation for bright red blood per rectum. GI consulted for rectal bleeding.    1. Rectal bleeding - no further evidence of rectal bleeding but now with vaginal bleeding- gyn following  Patient reportedly with multiple episodes of BRBPR noted in her diaper, no reported overt melena or hematochezia. Story of straining with passage of stool suggestive of hemorrhoidal bleeding, however, bandemia and tachycardia meeting SIRS criteria with LLQ pain and diarrhea concerning for diverticulitis/diverticular etiology of bleeding.   - Continue Ceftriaxone/Flagyl for colitis/diverticulitis  - f/u GI PCR, Stool Culture, C diff if diarrhea continues      2. Elevated LTs - now downtrending, workup thus far negative  Patient with elevated LTs on presentation, , ALT 70, normal bilirubin and alk phos suggestive of hepatocellular pattern. No reported history of liver disease, daughter denies EtOH history. Tylenol noted as home medication. No INR/PT prolongation noted on initial labs. Acetaminophen and Tylenol levels wnl.  - Obtain outpatient labs for collateral  - Complete abdominal US w/doppler- normal liver, patent main portal vein, but right and left not visualized  - Medications reviewed on [facility] LiverTox database: Acetaminophen class A, Aspirin class A, Rosuvastatin class B, Omeprazole class B, Zolpidem class E, Trazodone rare (letters corresponding to likelihood of etiology of liver injury)  -Hep A/B/C neg  -Iron studies not c/w hemochromatosis  - Ceruloplasmin, Alpha 1 Antitrypsin wnl  -Follow up, KAJAL, ASMA, AMA, Quantitative IgG  - Daily CMP and Coags  - UA with large blood but low RBCs with elevated Cr, may be element of rhabdomyolysis which can increase liver enzymes - obtain CK    Recommendations discussed with primary team  Case discussed with attending physician

## 2019-11-17 NOTE — PROGRESS NOTE ADULT - SUBJECTIVE AND OBJECTIVE BOX
Pt seen and examined at bedside.  Started on heparin gtt for DVT  No complaints at this time    PERTINENT REVIEW OF SYSTEMS:  CONSTITUTIONAL: No weakness, fevers or chills  HEENT: No visual changes; No vertigo or throat pain   GASTROINTESTINAL: No abdominal or epigastric pain. No nausea, vomiting, or hematemesis; No diarrhea or constipation. No melena or hematochezia.  NEUROLOGICAL: No numbness or weakness  SKIN: No itching, burning, rashes, or lesions     Allergies    codeine (Other)  Seafood (Other)  Sular (Other)    Intolerances      MEDICATIONS:  MEDICATIONS  (STANDING):  cefTRIAXone   IVPB 1000 milliGRAM(s) IV Intermittent every 24 hours  heparin  Infusion 900 Unit(s)/Hr (9 mL/Hr) IV Continuous <Continuous>  insulin lispro (HumaLOG) corrective regimen sliding scale   SubCutaneous Before meals and at bedtime  metroNIDAZOLE  IVPB 500 milliGRAM(s) IV Intermittent every 8 hours  pantoprazole    Tablet 40 milliGRAM(s) Oral before breakfast  potassium chloride    Tablet ER 40 milliEquivalent(s) Oral every 2 hours    MEDICATIONS  (PRN):    Vital Signs Last 24 Hrs  T(C): 36.8 (17 Nov 2019 05:53), Max: 37.2 (16 Nov 2019 21:47)  T(F): 98.2 (17 Nov 2019 05:53), Max: 99 (16 Nov 2019 21:47)  HR: 100 (17 Nov 2019 05:53) (99 - 107)  BP: 104/72 (17 Nov 2019 05:53) (102/66 - 132/73)  BP(mean): --  RR: 18 (17 Nov 2019 05:53) (18 - 18)  SpO2: 94% (17 Nov 2019 05:53) (94% - 99%)    11-16 @ 07:01  -  11-17 @ 07:00  --------------------------------------------------------  IN: 0 mL / OUT: 300 mL / NET: -300 mL      PHYSICAL EXAM:    General: Well developed; well nourished; in no acute distress  HEENT: MMM, conjunctiva and sclera clear  Gastrointestinal: Soft non-tender non-distended; Normal bowel sounds; No hepatosplenomegaly. No rebound or guarding  Skin: Warm and dry. No obvious rash    LABS:                        11.7   11.50 )-----------( 235      ( 17 Nov 2019 05:46 )             37.0     11-17    140  |  108  |  17  ----------------------------<  112<H>  2.8<LL>   |  16<L>  |  2.34<H>    Ca    8.5      17 Nov 2019 05:46  Phos  1.8     11-16  Mg     1.9     11-17    TPro  6.4  /  Alb  3.0<L>  /  TBili  0.3  /  DBili  x   /  AST  67<H>  /  ALT  50<H>  /  AlkPhos  72  11-17    PT/INR - ( 17 Nov 2019 05:46 )   PT: 17.4 sec;   INR: 1.52          PTT - ( 17 Nov 2019 08:12 )  PTT:103.4 sec                  RADIOLOGY & ADDITIONAL STUDIES:

## 2019-11-17 NOTE — PROGRESS NOTE ADULT - PROBLEM SELECTOR PLAN 10
F: None   E: Replete for K<4 Mag<2  N: DASH/TLC diet  A: None now in setting of GIB, SCDs  FULL CODE  Dispo:  RMF, discharge to [facility]

## 2019-11-17 NOTE — PROGRESS NOTE ADULT - SUBJECTIVE AND OBJECTIVE BOX
SUBJECTIVE:  Patient seen and examined at bedside. She says that she is not feeling well today because of pain in her stomach which is diffuse and dull. She denies any bowel movements. Otherwise, she denies fevers, chills, headaches, shortness of breath, chest pain, nausea, vomiting, diarrhea, constipation. She says that she is eating and drinking well.     Vital Signs Last 12 Hrs  T(F): 98.1 (11-17-19 @ 13:22), Max: 98.2 (11-17-19 @ 05:53)  HR: 96 (11-17-19 @ 13:22) (96 - 100)  BP: 107/70 (11-17-19 @ 13:22) (104/72 - 107/70)  BP(mean): --  RR: 16 (11-17-19 @ 13:22) (16 - 18)  SpO2: 96% (11-17-19 @ 13:22) (94% - 96%)  I&O's Summary    16 Nov 2019 07:01  -  17 Nov 2019 07:00  --------------------------------------------------------  IN: 0 mL / OUT: 300 mL / NET: -300 mL        PHYSICAL EXAM:  Constitutional: NAD, comfortable in bed.  HEENT: NC/AT, PERRLA, EOMI, no conjunctival pallor or scleral icterus, MMM  Neck: Supple, no JVD  Respiratory: CTA B/L. No w/r/r.   Cardiovascular: RRR, normal S1 and S2, no m/r/g.   Gastrointestinal: +BS, pain diffusely on palpation, soft ND, no guarding or rebound tenderness, no palpable masses   Extremities: wwp; no cyanosis, clubbing or edema.   Vascular: Pulses equal and strong throughout.   Neurological: AAOx1 (to person), no CN deficits, strength and sensation intact throughout.   Skin: No gross skin abnormalities or rashes        LABS:                        11.7   11.50 )-----------( 235      ( 17 Nov 2019 05:46 )             37.0     11-17    140  |  108  |  17  ----------------------------<  112<H>  2.8<LL>   |  16<L>  |  2.34<H>    Ca    8.5      17 Nov 2019 05:46  Phos  1.8     11-16  Mg     1.9     11-17    TPro  6.4  /  Alb  3.0<L>  /  TBili  0.3  /  DBili  x   /  AST  67<H>  /  ALT  50<H>  /  AlkPhos  72  11-17    PT/INR - ( 17 Nov 2019 05:46 )   PT: 17.4 sec;   INR: 1.52          PTT - ( 17 Nov 2019 08:12 )  PTT:103.4 sec        RADIOLOGY & ADDITIONAL TESTS:    MEDICATIONS  (STANDING):  cefTRIAXone   IVPB 1000 milliGRAM(s) IV Intermittent every 24 hours  heparin  Infusion 900 Unit(s)/Hr (9 mL/Hr) IV Continuous <Continuous>  insulin lispro (HumaLOG) corrective regimen sliding scale   SubCutaneous Before meals and at bedtime  metroNIDAZOLE  IVPB 500 milliGRAM(s) IV Intermittent every 8 hours  pantoprazole    Tablet 40 milliGRAM(s) Oral before breakfast    MEDICATIONS  (PRN):

## 2019-11-17 NOTE — PROGRESS NOTE ADULT - PROBLEM SELECTOR PLAN 6
Pt with Bun/ Cr 20/2.43 today, unknown baseline, likely multifactorial renal disease with pre-renal component in setting of diarrhea. S/p fluid repletion.   - F/u BMP daily  - Fena 1.8%, likely intrinsic

## 2019-11-18 ENCOUNTER — FORM ENCOUNTER (OUTPATIENT)
Age: 79
End: 2019-11-18

## 2019-11-18 LAB
ALBUMIN SERPL ELPH-MCNC: 2.8 G/DL — LOW (ref 3.3–5)
ALP SERPL-CCNC: 62 U/L — SIGNIFICANT CHANGE UP (ref 40–120)
ALT FLD-CCNC: 37 U/L — SIGNIFICANT CHANGE UP (ref 10–45)
ANION GAP SERPL CALC-SCNC: 12 MMOL/L — SIGNIFICANT CHANGE UP (ref 5–17)
APTT BLD: 69.7 SEC — HIGH (ref 27.5–36.3)
AST SERPL-CCNC: 43 U/L — HIGH (ref 10–40)
BILIRUB SERPL-MCNC: 0.3 MG/DL — SIGNIFICANT CHANGE UP (ref 0.2–1.2)
BLD GP AB SCN SERPL QL: NEGATIVE — SIGNIFICANT CHANGE UP
BUN SERPL-MCNC: 14 MG/DL — SIGNIFICANT CHANGE UP (ref 7–23)
CALCIUM SERPL-MCNC: 8.5 MG/DL — SIGNIFICANT CHANGE UP (ref 8.4–10.5)
CHLORIDE SERPL-SCNC: 112 MMOL/L — HIGH (ref 96–108)
CK SERPL-CCNC: 378 U/L — HIGH (ref 25–170)
CO2 SERPL-SCNC: 16 MMOL/L — LOW (ref 22–31)
CREAT SERPL-MCNC: 2.18 MG/DL — HIGH (ref 0.5–1.3)
GLUCOSE BLDC GLUCOMTR-MCNC: 129 MG/DL — HIGH (ref 70–99)
GLUCOSE BLDC GLUCOMTR-MCNC: 138 MG/DL — HIGH (ref 70–99)
GLUCOSE BLDC GLUCOMTR-MCNC: 148 MG/DL — HIGH (ref 70–99)
GLUCOSE BLDC GLUCOMTR-MCNC: 162 MG/DL — HIGH (ref 70–99)
GLUCOSE SERPL-MCNC: 137 MG/DL — HIGH (ref 70–99)
HCT VFR BLD CALC: 34.2 % — LOW (ref 34.5–45)
HGB BLD-MCNC: 11.4 G/DL — LOW (ref 11.5–15.5)
IGG SERPL-MCNC: 1081 MG/DL — SIGNIFICANT CHANGE UP (ref 700–1600)
IGG1 SER-MCNC: 486 MG/DL — SIGNIFICANT CHANGE UP (ref 248–810)
IGG2 SER-MCNC: 393 MG/DL — SIGNIFICANT CHANGE UP (ref 130–555)
IGG3 SER-MCNC: 113 MG/DL — HIGH (ref 15–102)
IGG4 SER-MCNC: 32 MG/DL — SIGNIFICANT CHANGE UP (ref 2–96)
INR BLD: 2.17 — HIGH (ref 0.88–1.16)
MAGNESIUM SERPL-MCNC: 2 MG/DL — SIGNIFICANT CHANGE UP (ref 1.6–2.6)
MCHC RBC-ENTMCNC: 28.4 PG — SIGNIFICANT CHANGE UP (ref 27–34)
MCHC RBC-ENTMCNC: 33.3 GM/DL — SIGNIFICANT CHANGE UP (ref 32–36)
MCV RBC AUTO: 85.3 FL — SIGNIFICANT CHANGE UP (ref 80–100)
MITOCHONDRIA AB SER-ACNC: SIGNIFICANT CHANGE UP
NRBC # BLD: 0 /100 WBCS — SIGNIFICANT CHANGE UP (ref 0–0)
PHOSPHATE SERPL-MCNC: 1.6 MG/DL — LOW (ref 2.5–4.5)
PLATELET # BLD AUTO: 246 K/UL — SIGNIFICANT CHANGE UP (ref 150–400)
POTASSIUM SERPL-MCNC: 3.5 MMOL/L — SIGNIFICANT CHANGE UP (ref 3.5–5.3)
POTASSIUM SERPL-SCNC: 3.5 MMOL/L — SIGNIFICANT CHANGE UP (ref 3.5–5.3)
PROT SERPL-MCNC: 6 G/DL — SIGNIFICANT CHANGE UP (ref 6–8.3)
PROTHROM AB SERPL-ACNC: 25.1 SEC — HIGH (ref 10–12.9)
RBC # BLD: 4.01 M/UL — SIGNIFICANT CHANGE UP (ref 3.8–5.2)
RBC # FLD: 14.6 % — HIGH (ref 10.3–14.5)
RH IG SCN BLD-IMP: POSITIVE — SIGNIFICANT CHANGE UP
SMOOTH MUSCLE AB SER-ACNC: SIGNIFICANT CHANGE UP
SODIUM SERPL-SCNC: 140 MMOL/L — SIGNIFICANT CHANGE UP (ref 135–145)
WBC # BLD: 10.65 K/UL — HIGH (ref 3.8–10.5)
WBC # FLD AUTO: 10.65 K/UL — HIGH (ref 3.8–10.5)

## 2019-11-18 PROCEDURE — 99233 SBSQ HOSP IP/OBS HIGH 50: CPT | Mod: GC

## 2019-11-18 PROCEDURE — 74019 RADEX ABDOMEN 2 VIEWS: CPT | Mod: 26

## 2019-11-18 PROCEDURE — 99232 SBSQ HOSP IP/OBS MODERATE 35: CPT

## 2019-11-18 RX ORDER — SENNA PLUS 8.6 MG/1
1 TABLET ORAL ONCE
Refills: 0 | Status: COMPLETED | OUTPATIENT
Start: 2019-11-18 | End: 2019-11-18

## 2019-11-18 RX ORDER — WARFARIN SODIUM 2.5 MG/1
1.5 TABLET ORAL ONCE
Refills: 0 | Status: COMPLETED | OUTPATIENT
Start: 2019-11-18 | End: 2019-11-18

## 2019-11-18 RX ORDER — WARFARIN SODIUM 2.5 MG/1
1 TABLET ORAL ONCE
Refills: 0 | Status: DISCONTINUED | OUTPATIENT
Start: 2019-11-18 | End: 2019-11-18

## 2019-11-18 RX ORDER — SODIUM CHLORIDE 9 MG/ML
500 INJECTION INTRAMUSCULAR; INTRAVENOUS; SUBCUTANEOUS ONCE
Refills: 0 | Status: COMPLETED | OUTPATIENT
Start: 2019-11-18 | End: 2019-11-18

## 2019-11-18 RX ORDER — POLYETHYLENE GLYCOL 3350 17 G/17G
17 POWDER, FOR SOLUTION ORAL ONCE
Refills: 0 | Status: COMPLETED | OUTPATIENT
Start: 2019-11-18 | End: 2019-11-20

## 2019-11-18 RX ORDER — POLYETHYLENE GLYCOL 3350 17 G/17G
17 POWDER, FOR SOLUTION ORAL ONCE
Refills: 0 | Status: COMPLETED | OUTPATIENT
Start: 2019-11-18 | End: 2019-11-18

## 2019-11-18 RX ORDER — CEFTRIAXONE 500 MG/1
1000 INJECTION, POWDER, FOR SOLUTION INTRAMUSCULAR; INTRAVENOUS EVERY 24 HOURS
Refills: 0 | Status: DISCONTINUED | OUTPATIENT
Start: 2019-11-18 | End: 2019-11-19

## 2019-11-18 RX ORDER — HEPARIN SODIUM 5000 [USP'U]/ML
500 INJECTION INTRAVENOUS; SUBCUTANEOUS
Qty: 25000 | Refills: 0 | Status: DISCONTINUED | OUTPATIENT
Start: 2019-11-18 | End: 2019-11-18

## 2019-11-18 RX ORDER — POTASSIUM CHLORIDE 20 MEQ
40 PACKET (EA) ORAL ONCE
Refills: 0 | Status: COMPLETED | OUTPATIENT
Start: 2019-11-18 | End: 2019-11-18

## 2019-11-18 RX ADMIN — PANTOPRAZOLE SODIUM 40 MILLIGRAM(S): 20 TABLET, DELAYED RELEASE ORAL at 06:13

## 2019-11-18 RX ADMIN — SODIUM CHLORIDE 500 MILLILITER(S): 9 INJECTION INTRAMUSCULAR; INTRAVENOUS; SUBCUTANEOUS at 00:02

## 2019-11-18 RX ADMIN — CEFTRIAXONE 100 MILLIGRAM(S): 500 INJECTION, POWDER, FOR SOLUTION INTRAMUSCULAR; INTRAVENOUS at 11:09

## 2019-11-18 RX ADMIN — HEPARIN SODIUM 5 UNIT(S)/HR: 5000 INJECTION INTRAVENOUS; SUBCUTANEOUS at 02:54

## 2019-11-18 RX ADMIN — Medication 100 MILLIGRAM(S): at 09:43

## 2019-11-18 RX ADMIN — Medication 10 MILLIGRAM(S): at 09:43

## 2019-11-18 RX ADMIN — SODIUM CHLORIDE 500 MILLILITER(S): 9 INJECTION INTRAMUSCULAR; INTRAVENOUS; SUBCUTANEOUS at 11:09

## 2019-11-18 RX ADMIN — Medication 100 MILLIGRAM(S): at 17:17

## 2019-11-18 RX ADMIN — Medication 100 MILLIGRAM(S): at 00:02

## 2019-11-18 RX ADMIN — POLYETHYLENE GLYCOL 3350 17 GRAM(S): 17 POWDER, FOR SOLUTION ORAL at 16:14

## 2019-11-18 RX ADMIN — SENNA PLUS 1 TABLET(S): 8.6 TABLET ORAL at 09:43

## 2019-11-18 RX ADMIN — WARFARIN SODIUM 1.5 MILLIGRAM(S): 2.5 TABLET ORAL at 23:01

## 2019-11-18 RX ADMIN — Medication 2: at 17:26

## 2019-11-18 RX ADMIN — Medication 40 MILLIEQUIVALENT(S): at 07:44

## 2019-11-18 NOTE — PROGRESS NOTE ADULT - PROBLEM SELECTOR PLAN 9
F: None   E: Replete for K<4 Mag<2  N: DASH/TLC diet  A: None now in setting of GIB, SCDs  FULL CODE  Dispo:  RMF, discharge to [facility]

## 2019-11-18 NOTE — DIETITIAN INITIAL EVALUATION ADULT. - PROBLEM SELECTOR PLAN 5
Pt with Bun/ Cr 25/2.64, unknown baseline, likely multifactorial renal disease with pre-renal component in setting of diarrhea. S/p fluid repletion.   - F/u BMPqd  - F/u Ulytes    #Hyperglycemia   Pt with Glucose 300s in the afternoon while NPO, with no prior hx of DM2.  - F/u A1C   - mISS started

## 2019-11-18 NOTE — PROVIDER CONTACT NOTE (OTHER) - SITUATION
critical value from lab: >200
Lab called- pt PTT value is 131.2 seconds
PT BP 87/66
Pt APTT from lab: 140.5
Pt's last BM was 11/13. Pt has not had bowel movement for 4 days
pottassium 2.8  MD Ham made aware.
pt has blood tinged vaginal discharge

## 2019-11-18 NOTE — PROGRESS NOTE ADULT - SUBJECTIVE AND OBJECTIVE BOX
GASTROENTEROLOGY PROGRESS NOTE  Patient seen and examined at bedside. Patient endorsing continued abdominal pain, denies passage of stool in days. No further bleeding reported by patient.    PERTINENT REVIEW OF SYSTEMS:  CONSTITUTIONAL: No weakness, fevers or chills  HEENT: No visual changes; No vertigo or throat pain   GASTROINTESTINAL: As above  NEUROLOGICAL: No numbness or weakness  SKIN: No itching, burning, rashes, or lesions     Allergies    codeine (Other)  Seafood (Other)  Sular (Other)    Intolerances      MEDICATIONS:  MEDICATIONS  (STANDING):  heparin  Infusion 500 Unit(s)/Hr (5 mL/Hr) IV Continuous <Continuous>  insulin lispro (HumaLOG) corrective regimen sliding scale   SubCutaneous Before meals and at bedtime  metroNIDAZOLE  IVPB 500 milliGRAM(s) IV Intermittent every 8 hours  pantoprazole    Tablet 40 milliGRAM(s) Oral before breakfast  polyethylene glycol 3350 17 Gram(s) Oral once  senna 1 Tablet(s) Oral once    MEDICATIONS  (PRN):  bisacodyl Suppository 10 milliGRAM(s) Rectal daily PRN Constipation    Vital Signs Last 24 Hrs  T(C): 36.9 (18 Nov 2019 06:18), Max: 36.9 (17 Nov 2019 20:41)  T(F): 98.4 (18 Nov 2019 06:18), Max: 98.5 (17 Nov 2019 20:41)  HR: 99 (18 Nov 2019 06:18) (96 - 111)  BP: 109/73 (18 Nov 2019 06:18) (87/66 - 115/57)  BP(mean): --  RR: 18 (18 Nov 2019 06:18) (16 - 18)  SpO2: 99% (18 Nov 2019 06:18) (96% - 99%)    PHYSICAL EXAM:    General: Well developed; well nourished; in no acute distress  HEENT: MMM, conjunctiva and sclera clear  Gastrointestinal: Soft non-tender non-distended; Normal bowel sounds; No hepatosplenomegaly. No rebound or guarding  Skin: Warm and dry. No obvious rash    LABS:                        11.4   10.65 )-----------( 246      ( 18 Nov 2019 05:37 )             34.2     11-18    140  |  112<H>  |  14  ----------------------------<  137<H>  3.5   |  16<L>  |  2.18<H>    Ca    8.5      18 Nov 2019 05:37  Phos  1.6     11-18  Mg     2.0     11-18    TPro  6.0  /  Alb  2.8<L>  /  TBili  0.3  /  DBili  x   /  AST  43<H>  /  ALT  37  /  AlkPhos  62  11-18    PT/INR - ( 18 Nov 2019 05:37 )   PT: 25.1 sec;   INR: 2.17          PTT - ( 18 Nov 2019 05:37 )  PTT:69.7 sec                  RADIOLOGY & ADDITIONAL STUDIES:  Reviewed

## 2019-11-18 NOTE — PROVIDER CONTACT NOTE (OTHER) - ACTION/TREATMENT ORDERED:
As per MD Ham- stop heparin
As per MD Lemus, check blood pressure, discontinue Heparin drip. MD lemus at bedside to [place] [facility] pt
As per MD Lemus, lower rate of heparin drip.
As per MD Lemus, will discuss with day team.
DR Ham made aware.
MD informed; MD Lemus at beside to assess pt
MD Lemus notified, recheck in 20 mins

## 2019-11-18 NOTE — PROGRESS NOTE ADULT - PROBLEM SELECTOR PLAN 3
Pt with AST//117 on admission with unknown baseline. Possibly 2/2 chronic Tylenol use (currently holding) vs NAFLD vs hepatitis (no cholestatic picture) vs 2/2 sepsis. Serum alpha-1-AT elevated. RUQ limited exam but negative for portal vein thrombosis.  - RUQ u/s - no acute abnormalities  - f/u CMP qd  - f/u KAJAL, ASMA, AMA, IgG    #asymmetrical RLE edema  Endorses present for a while (no exact time) denies tenderness.   - RLE doppler revealed DVT in right femoral vein   - will continue only on warfarin daily

## 2019-11-18 NOTE — DIETITIAN INITIAL EVALUATION ADULT. - OTHER INFO
80y/o female with history of HLD,Vacular Dementia,cardiomyopathy, GERD admitted with rectal bleeding/hematochezia and diverticulitis Rectal bleeding resolved. Noted new DVT and vaginal bleeding. Plans for Flex sig, in am. Constipation. No N/V. Skin intact,Patient complaining of abdominal discomfort. Suspected due to constipation Patient is 116% of IBW(with RLE edema). Unable to quantify diet or weights PTA.Noted to reside alone with assistance of daughter. Plans for D/C to [facility]. Patient noted presently to have fair appetite. Dislike of breakfast and lunch offerings .Noted seafood allergies.

## 2019-11-18 NOTE — PROGRESS NOTE ADULT - PROBLEM SELECTOR PLAN 5
Pt with Bun/ Cr 14/2.18 today, unknown baseline, likely multifactorial renal disease with pre-renal component in setting of diarrhea. S/p fluid repletion.   - SBP in low 100s s/p 500cc IVF  - F/u BMP daily  - Fena 1.8%, likely intrinsic

## 2019-11-18 NOTE — PROGRESS NOTE ADULT - ASSESSMENT
80yo F with a PMHx of HLD, dementia (reportedly A&Ox1 at baseline), unspecified cardiomyopathy, and GERD who was sent in by per PMD after evaluation for bright red blood per rectum. GI consulted for rectal bleeding.    1. Rectal bleeding - no further evidence of rectal bleeding but now with vaginal bleeding- gyn following  Patient reportedly with multiple episodes of BRBPR noted in her diaper, no reported overt melena or hematochezia. Story of straining with passage of stool suggestive of hemorrhoidal bleeding, however, bandemia and tachycardia meeting SIRS criteria with LLQ pain and diarrhea concerning for diverticulitis/diverticular etiology of bleeding.   - Continue antibiotic therapy for colitis/diverticulitis  - f/u GI PCR, Stool Culture, C diff if diarrhea continues  - Given lack of BM, would start aggressive bowel regimen with MiraLax QD or BID, Senna      2. Elevated LTs - now downtrending, workup thus far negative  Patient with elevated LTs on presentation, , ALT 70, normal bilirubin and alk phos suggestive of hepatocellular pattern. No reported history of liver disease, daughter denies EtOH history. Tylenol noted as home medication. No INR/PT prolongation noted on initial labs. Acetaminophen and Tylenol levels wnl.  - Obtain outpatient labs for collateral  - Complete abdominal US w/doppler- normal liver, patent main portal vein, but right and left not visualized  - Medications reviewed on [facility] LiverTox database: Acetaminophen class A, Aspirin class A, Rosuvastatin class B, Omeprazole class B, Zolpidem class E, Trazodone rare (letters corresponding to likelihood of etiology of liver injury)  - Hep A/B/C neg  - Iron studies not c/w hemochromatosis  - Ceruloplasmin, Alpha 1 Antitrypsin wnl  - Follow up, KAJAL, ASMA, AMA, Quantitative IgG  - Daily CMP and Coags  - UA with large blood but low RBCs with elevated Cr, may be element of rhabdomyolysis which can increase liver enzymes - obtain CK    Recommendations discussed with primary team  Case discussed with attending physician 80yo F with a PMHx of HLD, dementia (reportedly A&Ox1 at baseline), unspecified cardiomyopathy, and GERD who was sent in by per PMD after evaluation for bright red blood per rectum. GI consulted for rectal bleeding.    1. Rectal bleeding - no further evidence of rectal bleeding but now with vaginal bleeding- gyn following  Patient reportedly with multiple episodes of BRBPR noted in her diaper, no reported overt melena or hematochezia. Story of straining with passage of stool suggestive of hemorrhoidal bleeding, however, bandemia and tachycardia meeting SIRS criteria with LLQ pain and diarrhea concerning for diverticulitis/diverticular etiology of bleeding.   - Continue antibiotic therapy for colitis/diverticulitis  - f/u GI PCR, Stool Culture, C diff if diarrhea continues  - Given lack of BM, would start aggressive bowel regimen with MiraLax QD or BID, Senna  - Plan for Flexible Sigmoidoscopy in AM  - NPO at MN  - Patient will need two tap water enemas immediately prior to procedure    2. Elevated LTs - now downtrending, workup thus far negative  Patient with elevated LTs on presentation, , ALT 70, normal bilirubin and alk phos suggestive of hepatocellular pattern. No reported history of liver disease, daughter denies EtOH history. Tylenol noted as home medication. No INR/PT prolongation noted on initial labs. Acetaminophen and Tylenol levels wnl.  - Obtain outpatient labs for collateral  - Complete abdominal US w/doppler- normal liver, patent main portal vein, but right and left not visualized  - Medications reviewed on [facility] LiverTox database: Acetaminophen class A, Aspirin class A, Rosuvastatin class B, Omeprazole class B, Zolpidem class E, Trazodone rare (letters corresponding to likelihood of etiology of liver injury)  - Hep A/B/C neg  - Iron studies not c/w hemochromatosis  - Ceruloplasmin, Alpha 1 Antitrypsin wnl  - Follow up, KAJAL, ASMA, AMA, Quantitative IgG  - Daily CMP and Coags  - UA with large blood but low RBCs with elevated Cr, may be element of rhabdomyolysis which can increase liver enzymes - obtain CK    Recommendations discussed with primary team  Case discussed with attending physician

## 2019-11-18 NOTE — DIETITIAN INITIAL EVALUATION ADULT. - PROBLEM SELECTOR PLAN 1
Progress Note    Date: 9/9/2019  Time: 7:15 AM    Raúl Saeed is a 74 y.o. female     Patient was seen and examined. She has no complaints currently. Pain is  controlled. Patient is  tolerating oral intake. She is urinating well with assistance. She is unsure if she has had any more vaginal bleeding as she uses restroom with assistance and does not look. She has not noticed any. She is  ambulating small amounts with assistance. She is  passing flatus and having regular bowel movements. She denies Fever/Chills, Chest Pain, SOB, N/V, Calf Pain. She reports a 40 lb weight loss since April, but she was trying to do so. She reports not having much of an appetite but denies bloating. Vitals:  Vitals:    09/08/19 0104 09/08/19 0413 09/08/19 0600 09/08/19 1932   BP: 138/68   (!) 124/51   Pulse: 71 61  69   Resp: 25 16  25   Temp: 98.8 °F (37.1 °C)   97.9 °F (36.6 °C)   TempSrc: Oral   Oral   SpO2: 94% 97%  92%   Weight:   266 lb 15.6 oz (121.1 kg)    Height:           Intake/Output:   Last Shift: I/O last 3 completed shifts: In: 240 [P.O.:240]  Out: 400 [Urine:400]  Current Shift: No intake/output data recorded. Physical Exam:  General:  no apparent distress, alert and cooperative  Neurologic:  alert, oriented, normal speech, no focal findings or movement disorder noted  Lungs:  No increased work of breathing, good air exchange, clear to auscultation bilaterally, no crackles or wheezing, 2L O2 by nasal canula in place  Heart: regular rate and rhythm, no murmur noted  Abdomen: Abdomen soft but taught, non-tender. No rebound tenderness, guarding, rigidity. Negative fluid wave.  BS normal. No masses appreciated,  No organomegaly  Extremities:  no calf tenderness, +1 pitting edema bilaterally on LE    Lab:  Recent Results (from the past 24 hour(s))   HEMOGLOBIN AND HEMATOCRIT, BLOOD    Collection Time: 09/08/19 12:35 PM   Result Value Ref Range    Hemoglobin 7.9 (L) 11.9 - 15.1 g/dL    Hematocrit 25.2 (L) 36.3 - 47.1 %   HEMOGLOBIN AND HEMATOCRIT, BLOOD    Collection Time: 09/08/19  6:45 PM   Result Value Ref Range    Hemoglobin 8.2 (L) 11.9 - 15.1 g/dL    Hematocrit 27.1 (L) 36.3 - 47.1 %       Collection Time: 09/08/19  6:45 PM   Result Value Ref Range     2780 (H) <38 U/mL   CEA    Collection Time: 09/08/19  6:45 PM   Result Value Ref Range    CEA 25.1 (H) <3.9 ng/mL   CANCER ANTIGEN 19-9    Collection Time: 09/08/19  6:45 PM   Result Value Ref Range    CA 19-9 2309 (H) 0 - 35 U/mL   HEMOGLOBIN AND HEMATOCRIT, BLOOD    Collection Time: 09/09/19 12:13 AM   Result Value Ref Range    Hemoglobin 7.1 (L) 11.9 - 15.1 g/dL    Hematocrit 23.0 (L) 36.3 - 47.1 %   Basic Metabolic Panel w/ Reflex to MG    Collection Time: 09/09/19  5:17 AM   Result Value Ref Range    Glucose 96 70 - 99 mg/dL    BUN 25 (H) 8 - 23 mg/dL    CREATININE 0.97 (H) 0.50 - 0.90 mg/dL    Bun/Cre Ratio NOT REPORTED 9 - 20    Calcium 7.7 (L) 8.6 - 10.4 mg/dL    Sodium 136 135 - 144 mmol/L    Potassium 3.8 3.7 - 5.3 mmol/L    Chloride 102 98 - 107 mmol/L    CO2 25 20 - 31 mmol/L    Anion Gap 9 9 - 17 mmol/L    GFR Non-African American 56 (L) >60 mL/min    GFR African American >60 >60 mL/min    GFR Comment          GFR Staging NOT REPORTED    CBC WITH AUTO DIFFERENTIAL    Collection Time: 09/09/19  5:17 AM   Result Value Ref Range    WBC 7.2 3.5 - 11.3 k/uL    RBC 2.28 (L) 3.95 - 5.11 m/uL    Hemoglobin 7.3 (L) 11.9 - 15.1 g/dL    Hematocrit 24.2 (L) 36.3 - 47.1 %    .1 (H) 82.6 - 102.9 fL    MCH 32.0 25.2 - 33.5 pg    MCHC 30.2 28.4 - 34.8 g/dL    RDW 16.1 (H) 11.8 - 14.4 %    Platelets 080 273 - 644 k/uL    MPV 9.5 8.1 - 13.5 fL    NRBC Automated 0.0 0.0 per 100 WBC    Differential Type NOT REPORTED     WBC Morphology NOT REPORTED     RBC Morphology ANISOCYTOSIS PRESENT     Platelet Estimate NOT REPORTED     Seg Neutrophils 77 (H) 36 - 65 %    Lymphocytes 13 (L) 24 - 43 %    Monocytes 8 3 - 12 %    Eosinophils % 2 1 - 4 %    Basophils 1 0 - contrast as recommended. 4.  May need GI consult to rule out GI primary site depending on histology. Patient has never had a colonoscopy. Martha Bernal MD    [facility] gynecologic oncology              3.  Vision is not a surgical candidate so we will consult medical oncology for planning purposes. Pt with BRBPR possibly 2/2 diverticulosis vs hemorrhoids in setting od straining with constipation prior to diarrhea. Currently improving, only 1 BM (blood spotting) since admission. Hb stable.   - Maintain active Type and screen   - CBCqd  - Monitor BMs   - Maintain 2 large bore IVs

## 2019-11-18 NOTE — PROGRESS NOTE ADULT - PROBLEM SELECTOR PLAN 1
On admission, pt with hematochezia and LLQ abd pain. Tachycardic 110s, WBC 11, Bands 17 (3/4 SIRS). CT A/P with recto-sigmoid thickness concerning for colitis. WBC today increased to 11.41 from 10.26   - f/u GI rec's  - C/w ceftriaxone 1g daily and flagyl 500mg TID  - Pending stool studies, GI/PCR - no BM yet  - NPO after midnight for flex sig tomorrow  - f/u abdominal xray for constipation   - started on bowel regimen

## 2019-11-18 NOTE — PROGRESS NOTE ADULT - ATTENDING COMMENTS
AGMA: Pending BHB and lactate.   Colitis: Pending stool studies, cont abx   Vaginal discharge: Pending GYN and transvaginal U/S  Transaminitis: Pending RUQ U/S
pt seen and examined by me  no pain  tolerating po  VSS    1 colitis- c/w abx  2- DVT- on heparin coumadin given CR clearance <30  3- Volodymyr on CKD stage 3
Pt just had a bowel movement. Plan for flex sig tomorrow.
pt seen and examined by me case d/w housestaff agree with VS, PE, assessment and plan as outlined above

## 2019-11-18 NOTE — DIETITIAN INITIAL EVALUATION ADULT. - PROBLEM SELECTOR PLAN 4
PMH of vascular dementia daughter unclear if CVA in the past.   - Baseline AAOx1 HCP daughter  - F/u PT in am    #AMS/ Stroke R/o   Pt found to be more altered and endorsing severe HA worst in her life s/p stroke code and CT imaging negative for an acute process. Patient likely altered secondary to baseline dementia vs sepsis.   - F/u neurology recs  - Currently HA resolved as per patient but as per neurology note if continues, to have severe HA, consider LP to r/o SAH

## 2019-11-18 NOTE — PROGRESS NOTE ADULT - SUBJECTIVE AND OBJECTIVE BOX
OVERNIGHT EVENTS: Hypotensive to SBP 80s-90s, 11:00AM 250cc bolus     SUBJECTIVE: Patient seen and examined at bedside. She is resting comfortably in bed but reports that she is not feeling well and cannot describe why. Upon questioning, she says that she still has the abdominal pain which is diffuse. She says that she is able to pass gas but hasn't had a bowel movement yet. Otherwise, she denies fevers, chills, headaches, dizziness, chest pain, nausea, and vomiting.    Vital Signs Last 12 Hrs  T(F): 98.4 (11-18-19 @ 06:18), Max: 98.4 (11-18-19 @ 06:18)  HR: 99 (11-18-19 @ 06:18) (99 - 111)  BP: 109/73 (11-18-19 @ 06:18) (109/73 - 115/57)  BP(mean): --  RR: 18 (11-18-19 @ 06:18) (17 - 18)  SpO2: 99% (11-18-19 @ 06:18) (97% - 99%)  I&O's Summary    18 Nov 2019 07:01  -  18 Nov 2019 10:50  --------------------------------------------------------  IN: 15 mL / OUT: 0 mL / NET: 15 mL        PHYSICAL EXAM:  Constitutional: NAD, comfortable in bed.  HEENT: NC/AT, PERRLA, EOMI, no conjunctival pallor or scleral icterus, MMM  Neck: Supple, no JVD  Respiratory: CTA B/L. No w/r/r.   Cardiovascular: RRR, normal S1 and S2, no m/r/g.   Gastrointestinal: +BS, soft NTND, no guarding or rebound tenderness, no palpable masses   Extremities: wwp; no cyanosis, clubbing or edema.   Vascular: Pulses equal and strong throughout.   Neurological: AAOx3, no CN deficits, strength and sensation intact throughout.   Skin: No gross skin abnormalities or rashes        LABS:                        11.4   10.65 )-----------( 246      ( 18 Nov 2019 05:37 )             34.2     11-18    140  |  112<H>  |  14  ----------------------------<  137<H>  3.5   |  16<L>  |  2.18<H>    Ca    8.5      18 Nov 2019 05:37  Phos  1.6     11-18  Mg     2.0     11-18    TPro  6.0  /  Alb  2.8<L>  /  TBili  0.3  /  DBili  x   /  AST  43<H>  /  ALT  37  /  AlkPhos  62  11-18    PT/INR - ( 18 Nov 2019 05:37 )   PT: 25.1 sec;   INR: 2.17          PTT - ( 18 Nov 2019 05:37 )  PTT:69.7 sec        RADIOLOGY & ADDITIONAL TESTS:    MEDICATIONS  (STANDING):  cefTRIAXone   IVPB 1000 milliGRAM(s) IV Intermittent every 24 hours  insulin lispro (HumaLOG) corrective regimen sliding scale   SubCutaneous Before meals and at bedtime  metroNIDAZOLE  IVPB 500 milliGRAM(s) IV Intermittent every 8 hours  pantoprazole    Tablet 40 milliGRAM(s) Oral before breakfast  polyethylene glycol 3350 17 Gram(s) Oral once  sodium chloride 0.9% Bolus 500 milliLiter(s) IV Bolus once    MEDICATIONS  (PRN):  bisacodyl Suppository 10 milliGRAM(s) Rectal daily PRN Constipation OVERNIGHT EVENTS: Hypotensive to SBP 80s-90s, 11:00AM 250cc bolus     SUBJECTIVE: Patient seen and examined at bedside. She is resting comfortably in bed but reports that she is not feeling well and cannot describe why. Upon questioning, she says that she still has the abdominal pain which is diffuse. She says that she is able to pass gas but hasn't had a bowel movement yet. Otherwise, she denies fevers, chills, headaches, dizziness, chest pain, nausea, and vomiting.    Vital Signs Last 12 Hrs  T(F): 98.4 (11-18-19 @ 06:18), Max: 98.4 (11-18-19 @ 06:18)  HR: 99 (11-18-19 @ 06:18) (99 - 111)  BP: 109/73 (11-18-19 @ 06:18) (109/73 - 115/57)  BP(mean): --  RR: 18 (11-18-19 @ 06:18) (17 - 18)  SpO2: 99% (11-18-19 @ 06:18) (97% - 99%)  I&O's Summary    18 Nov 2019 07:01  -  18 Nov 2019 10:50  --------------------------------------------------------  IN: 15 mL / OUT: 0 mL / NET: 15 mL        PHYSICAL EXAM:  Constitutional: NAD, comfortable in bed.  HEENT: NC/AT, PERRLA, EOMI, no conjunctival pallor or scleral icterus, MMM  Neck: Supple, no JVD  Respiratory: CTA B/L. No w/r/r.   Cardiovascular: RRR, normal S1 and S2, no m/r/g.   Gastrointestinal: +BS, soft, tender diffusely in all 4 quadrants on light palpation, ND, no guarding or rebound tenderness, no palpable masses   Extremities: RLE calf slightly larger than LLE, RLE calf tenderness, wwp; no cyanosis, clubbing   Vascular: Pulses equal and strong throughout.   Neurological: AAOx1, no CN deficits, strength and sensation intact throughout.   Skin: No gross skin abnormalities or rashes        LABS:                        11.4   10.65 )-----------( 246      ( 18 Nov 2019 05:37 )             34.2     11-18    140  |  112<H>  |  14  ----------------------------<  137<H>  3.5   |  16<L>  |  2.18<H>    Ca    8.5      18 Nov 2019 05:37  Phos  1.6     11-18  Mg     2.0     11-18    TPro  6.0  /  Alb  2.8<L>  /  TBili  0.3  /  DBili  x   /  AST  43<H>  /  ALT  37  /  AlkPhos  62  11-18    PT/INR - ( 18 Nov 2019 05:37 )   PT: 25.1 sec;   INR: 2.17          PTT - ( 18 Nov 2019 05:37 )  PTT:69.7 sec        RADIOLOGY & ADDITIONAL TESTS:    MEDICATIONS  (STANDING):  cefTRIAXone   IVPB 1000 milliGRAM(s) IV Intermittent every 24 hours  insulin lispro (HumaLOG) corrective regimen sliding scale   SubCutaneous Before meals and at bedtime  metroNIDAZOLE  IVPB 500 milliGRAM(s) IV Intermittent every 8 hours  pantoprazole    Tablet 40 milliGRAM(s) Oral before breakfast  polyethylene glycol 3350 17 Gram(s) Oral once  sodium chloride 0.9% Bolus 500 milliLiter(s) IV Bolus once    MEDICATIONS  (PRN):  bisacodyl Suppository 10 milliGRAM(s) Rectal daily PRN Constipation

## 2019-11-18 NOTE — DIETITIAN INITIAL EVALUATION ADULT. - PROBLEM SELECTOR PLAN 3
Pt with AST//117 on admission with unknown baseline. Possibly 2/2 chronic tylenol use (currently holding) vs NAFLD, vs  hepatitis (no cholestatic picture) vs 2/2 sepsis.   - hepatitis panel   - salicylate/ acet level nL   - f/u CMP qd  - F/u RUQ u/s     #asymmetrical RLE edema  Endorses present for a while (no exact time) denies tenderness.   - F/u RLE doppler for r/o dvt

## 2019-11-19 ENCOUNTER — RESULT REVIEW (OUTPATIENT)
Age: 79
End: 2019-11-19

## 2019-11-19 PROBLEM — I42.9 CARDIOMYOPATHY, UNSPECIFIED: Chronic | Status: ACTIVE | Noted: 2019-11-13

## 2019-11-19 PROBLEM — Z00.00 ENCOUNTER FOR PREVENTIVE HEALTH EXAMINATION: Status: ACTIVE | Noted: 2019-11-19

## 2019-11-19 PROBLEM — K21.9 GASTRO-ESOPHAGEAL REFLUX DISEASE WITHOUT ESOPHAGITIS: Chronic | Status: ACTIVE | Noted: 2019-11-13

## 2019-11-19 PROBLEM — E78.5 HYPERLIPIDEMIA, UNSPECIFIED: Chronic | Status: ACTIVE | Noted: 2019-11-13

## 2019-11-19 PROBLEM — F03.90 UNSPECIFIED DEMENTIA WITHOUT BEHAVIORAL DISTURBANCE: Chronic | Status: ACTIVE | Noted: 2019-11-13

## 2019-11-19 LAB
ALBUMIN SERPL ELPH-MCNC: 2.8 G/DL — LOW (ref 3.3–5)
ALP SERPL-CCNC: 62 U/L — SIGNIFICANT CHANGE UP (ref 40–120)
ALT FLD-CCNC: 32 U/L — SIGNIFICANT CHANGE UP (ref 10–45)
ANA TITR SER: NEGATIVE — SIGNIFICANT CHANGE UP
ANION GAP SERPL CALC-SCNC: 11 MMOL/L — SIGNIFICANT CHANGE UP (ref 5–17)
ANION GAP SERPL CALC-SCNC: 13 MMOL/L — SIGNIFICANT CHANGE UP (ref 5–17)
APTT BLD: 35.5 SEC — SIGNIFICANT CHANGE UP (ref 27.5–36.3)
AST SERPL-CCNC: 38 U/L — SIGNIFICANT CHANGE UP (ref 10–40)
BILIRUB SERPL-MCNC: 0.3 MG/DL — SIGNIFICANT CHANGE UP (ref 0.2–1.2)
BUN SERPL-MCNC: 11 MG/DL — SIGNIFICANT CHANGE UP (ref 7–23)
BUN SERPL-MCNC: 11 MG/DL — SIGNIFICANT CHANGE UP (ref 7–23)
CALCIUM SERPL-MCNC: 8.8 MG/DL — SIGNIFICANT CHANGE UP (ref 8.4–10.5)
CALCIUM SERPL-MCNC: 8.9 MG/DL — SIGNIFICANT CHANGE UP (ref 8.4–10.5)
CHLORIDE SERPL-SCNC: 109 MMOL/L — HIGH (ref 96–108)
CHLORIDE SERPL-SCNC: 109 MMOL/L — HIGH (ref 96–108)
CO2 SERPL-SCNC: 17 MMOL/L — LOW (ref 22–31)
CO2 SERPL-SCNC: 18 MMOL/L — LOW (ref 22–31)
CREAT SERPL-MCNC: 1.79 MG/DL — HIGH (ref 0.5–1.3)
CREAT SERPL-MCNC: 1.97 MG/DL — HIGH (ref 0.5–1.3)
GLUCOSE BLDC GLUCOMTR-MCNC: 133 MG/DL — HIGH (ref 70–99)
GLUCOSE BLDC GLUCOMTR-MCNC: 140 MG/DL — HIGH (ref 70–99)
GLUCOSE BLDC GLUCOMTR-MCNC: 161 MG/DL — HIGH (ref 70–99)
GLUCOSE BLDC GLUCOMTR-MCNC: 178 MG/DL — HIGH (ref 70–99)
GLUCOSE SERPL-MCNC: 144 MG/DL — HIGH (ref 70–99)
GLUCOSE SERPL-MCNC: 153 MG/DL — HIGH (ref 70–99)
HCT VFR BLD CALC: 34.8 % — SIGNIFICANT CHANGE UP (ref 34.5–45)
HGB BLD-MCNC: 11.5 G/DL — SIGNIFICANT CHANGE UP (ref 11.5–15.5)
INR BLD: 2.69 — HIGH (ref 0.88–1.16)
MAGNESIUM SERPL-MCNC: 1.8 MG/DL — SIGNIFICANT CHANGE UP (ref 1.6–2.6)
MCHC RBC-ENTMCNC: 28 PG — SIGNIFICANT CHANGE UP (ref 27–34)
MCHC RBC-ENTMCNC: 33 GM/DL — SIGNIFICANT CHANGE UP (ref 32–36)
MCV RBC AUTO: 84.9 FL — SIGNIFICANT CHANGE UP (ref 80–100)
NRBC # BLD: 0 /100 WBCS — SIGNIFICANT CHANGE UP (ref 0–0)
PHOSPHATE SERPL-MCNC: 1.8 MG/DL — LOW (ref 2.5–4.5)
PLATELET # BLD AUTO: 254 K/UL — SIGNIFICANT CHANGE UP (ref 150–400)
POTASSIUM SERPL-MCNC: 3.1 MMOL/L — LOW (ref 3.5–5.3)
POTASSIUM SERPL-MCNC: 3.9 MMOL/L — SIGNIFICANT CHANGE UP (ref 3.5–5.3)
POTASSIUM SERPL-SCNC: 3.1 MMOL/L — LOW (ref 3.5–5.3)
POTASSIUM SERPL-SCNC: 3.9 MMOL/L — SIGNIFICANT CHANGE UP (ref 3.5–5.3)
PROT SERPL-MCNC: 6.1 G/DL — SIGNIFICANT CHANGE UP (ref 6–8.3)
PROTHROM AB SERPL-ACNC: 31.4 SEC — HIGH (ref 10–12.9)
RBC # BLD: 4.1 M/UL — SIGNIFICANT CHANGE UP (ref 3.8–5.2)
RBC # FLD: 14.6 % — HIGH (ref 10.3–14.5)
SODIUM SERPL-SCNC: 138 MMOL/L — SIGNIFICANT CHANGE UP (ref 135–145)
SODIUM SERPL-SCNC: 139 MMOL/L — SIGNIFICANT CHANGE UP (ref 135–145)
WBC # BLD: 9.95 K/UL — SIGNIFICANT CHANGE UP (ref 3.8–10.5)
WBC # FLD AUTO: 9.95 K/UL — SIGNIFICANT CHANGE UP (ref 3.8–10.5)

## 2019-11-19 PROCEDURE — 76830 TRANSVAGINAL US NON-OB: CPT | Mod: 26

## 2019-11-19 PROCEDURE — 45331 SIGMOIDOSCOPY AND BIOPSY: CPT

## 2019-11-19 PROCEDURE — 99233 SBSQ HOSP IP/OBS HIGH 50: CPT | Mod: GC

## 2019-11-19 RX ORDER — WARFARIN SODIUM 2.5 MG/1
1.5 TABLET ORAL ONCE
Refills: 0 | Status: COMPLETED | OUTPATIENT
Start: 2019-11-19 | End: 2019-11-19

## 2019-11-19 RX ORDER — POTASSIUM CHLORIDE 20 MEQ
40 PACKET (EA) ORAL ONCE
Refills: 0 | Status: COMPLETED | OUTPATIENT
Start: 2019-11-19 | End: 2019-11-19

## 2019-11-19 RX ORDER — LANOLIN ALCOHOL/MO/W.PET/CERES
5 CREAM (GRAM) TOPICAL AT BEDTIME
Refills: 0 | Status: DISCONTINUED | OUTPATIENT
Start: 2019-11-19 | End: 2019-11-21

## 2019-11-19 RX ORDER — POTASSIUM CHLORIDE 20 MEQ
40 PACKET (EA) ORAL
Refills: 0 | Status: COMPLETED | OUTPATIENT
Start: 2019-11-19 | End: 2019-11-19

## 2019-11-19 RX ORDER — MAGNESIUM SULFATE 500 MG/ML
2 VIAL (ML) INJECTION ONCE
Refills: 0 | Status: COMPLETED | OUTPATIENT
Start: 2019-11-19 | End: 2019-11-19

## 2019-11-19 RX ORDER — POTASSIUM PHOSPHATE, MONOBASIC POTASSIUM PHOSPHATE, DIBASIC 236; 224 MG/ML; MG/ML
15 INJECTION, SOLUTION INTRAVENOUS ONCE
Refills: 0 | Status: COMPLETED | OUTPATIENT
Start: 2019-11-19 | End: 2019-11-19

## 2019-11-19 RX ADMIN — Medication 50 GRAM(S): at 07:35

## 2019-11-19 RX ADMIN — Medication 40 MILLIEQUIVALENT(S): at 07:38

## 2019-11-19 RX ADMIN — Medication 100 MILLIGRAM(S): at 01:20

## 2019-11-19 RX ADMIN — Medication 40 MILLIEQUIVALENT(S): at 15:33

## 2019-11-19 RX ADMIN — Medication 5 MILLIGRAM(S): at 21:32

## 2019-11-19 RX ADMIN — WARFARIN SODIUM 1.5 MILLIGRAM(S): 2.5 TABLET ORAL at 21:32

## 2019-11-19 RX ADMIN — POTASSIUM PHOSPHATE, MONOBASIC POTASSIUM PHOSPHATE, DIBASIC 63.75 MILLIMOLE(S): 236; 224 INJECTION, SOLUTION INTRAVENOUS at 08:45

## 2019-11-19 RX ADMIN — PANTOPRAZOLE SODIUM 40 MILLIGRAM(S): 20 TABLET, DELAYED RELEASE ORAL at 05:28

## 2019-11-19 RX ADMIN — Medication 2: at 21:33

## 2019-11-19 NOTE — PROGRESS NOTE ADULT - PROBLEM SELECTOR PLAN 2
Pt with BRBPR possibly 2/2 likely colitis. Hb 12.0, stable. Pt found to have red discharge from her vagina. GYN attributed it to vaginal discharge not concerning for bleeding on external exam.   - f/u recs from GYN - pt refused TV U/S  - Maintain 2 large bore IVs  - Maintain active Type and screen   - CBC daily  - Monitor BMs, none since admission  - f/u GI/PCR Pt with BRBPR possibly 2/2 likely colitis. Hb 12.0, stable. Pt found to have red discharge from her vagina. GYN attributed it to vaginal discharge not concerning for bleeding on external exam.   - f/u recs from GYN   - TV U/S: Limited study due to abundant intra-pelvic gas and patient premature   termination of examination. No significant interval change from prior imaging.  - Maintain 2 large bore IVs  - Maintain active Type and screen   - CBC daily  - Monitor BMs, none since admission  - f/u GI/PCR

## 2019-11-19 NOTE — PROGRESS NOTE ADULT - PROBLEM SELECTOR PLAN 3
Pt with AST//117 on admission with unknown baseline. Possibly 2/2 chronic Tylenol use (currently holding) vs NAFLD vs hepatitis (no cholestatic picture) vs 2/2 sepsis. Serum alpha-1-AT elevated. RUQ limited exam but negative for portal vein thrombosis.  - RUQ u/s - no acute abnormalities  - f/u CMP qd  - f/u KAJAL, ASMA, AMA, IgG    #asymmetrical RLE edema  Endorses present for a while (no exact time) denies tenderness.   - RLE doppler revealed DVT in right femoral vein   - will continue only on warfarin daily, 1.5MG tonight

## 2019-11-19 NOTE — PROGRESS NOTE ADULT - PROBLEM SELECTOR PLAN 9
F: None   E: Replete for K<4 Mag<2  N: DASH/TLC diet  A: None now in setting of GIB, SCDs  FULL CODE  Dispo:  RMF, discharge to [facility]

## 2019-11-19 NOTE — PROGRESS NOTE ADULT - PROBLEM SELECTOR PLAN 1
On admission, pt with hematochezia and LLQ abd pain. Tachycardic 110s, WBC 11, Bands 17 (3/4 SIRS). CT A/P with recto-sigmoid thickness concerning for colitis. WBC today increased to 11.41 from 10.26   - f/u GI rec's  - s/p 5 days of ceftriaxone 1g daily and flagyl 500mg TID  - Pending stool studies, GI/PCR - no BM yet  - abdominal xray - no acute dilations or perforations  - started on bowel regimen  - flex sig today - f/u results On admission, pt with hematochezia and LLQ abd pain. Tachycardic 110s, WBC 11, Bands 17 (3/4 SIRS). CT A/P with recto-sigmoid thickness concerning for colitis. WBC today increased to 11.41 from 10.26   - f/u GI rec's  - s/p 5 days of ceftriaxone 1g daily and flagyl 500mg TID  - Pending stool studies, GI/PCR - no BM yet  - abdominal xray - no acute dilations or perforations  - started on bowel regimen  - flex sig today - f/u results    #hypokalemia  - potassium today 3.1, received 20meq of potassium tablet and 15 milimoles pf potassium phosphate. Refused large tablets. Will resume potassium powder when patient returns from flex sig  - f/u BMP

## 2019-11-19 NOTE — CHART NOTE - NSCHARTNOTEFT_GEN_A_CORE
GYN Consult Note    Given patient's history of GI bleed, s/p hysterectomy and no bleeding on speculum exam, patient's bleeding unlikely to be from GYN source. Also transvaginal ultrasound confirms patient being s/p hysterectomy. GYN to sign off at this time. Please call with any questions. [phone]. Discussed with Dr. Perez

## 2019-11-19 NOTE — PROGRESS NOTE ADULT - SUBJECTIVE AND OBJECTIVE BOX
SUBJECTIVE:  Patient seen and examined at bedside. She appears comfortable and in no acute distress. She says she still has pain in her abdomen. She points to her entire stomach and does not localize a specific area. She says she has not had a bowel movement yet. She says she is eating and drinking well. Otherwise, she denies fevers, chills, chest pain, shortness of breath, nausea, vomiting.     Vital Signs Last 12 Hrs  T(F): 97.5 (11-19-19 @ 05:47), Max: 97.5 (11-19-19 @ 05:47)  HR: 96 (11-19-19 @ 05:47) (96 - 96)  BP: 101/68 (11-19-19 @ 05:47) (101/68 - 101/68)  BP(mean): --  RR: 18 (11-19-19 @ 05:47) (18 - 18)  SpO2: 97% (11-19-19 @ 05:47) (97% - 97%)  I&O's Summary    18 Nov 2019 07:01  -  19 Nov 2019 07:00  --------------------------------------------------------  IN: 615 mL / OUT: 0 mL / NET: 615 mL        PHYSICAL EXAM:  Constitutional: NAD, comfortable in bed.  HEENT: NC/AT, PERRLA, EOMI, no conjunctival pallor or scleral icterus, MMM  Neck: Supple, no JVD  Respiratory: CTA B/L. No w/r/r.   Cardiovascular: RRR, normal S1 and S2, no m/r/g.   Gastrointestinal: +BS, tender on palpation in all four quadrants most prominently on the left side, soft nondistended, no guarding or rebound tenderness, no palpable masses   Extremities: wwp; no cyanosis, clubbing or edema.   Vascular: Pulses equal and strong throughout.   Neurological: AAOx1, no CN deficits, strength and sensation intact throughout.   Skin: No gross skin abnormalities or rashes        LABS:                        11.5   9.95  )-----------( 254      ( 19 Nov 2019 05:32 )             34.8     11-19    138  |  109<H>  |  11  ----------------------------<  144<H>  3.1<L>   |  18<L>  |  1.97<H>    Ca    8.8      19 Nov 2019 05:32  Phos  1.8     11-19  Mg     1.8     11-19    TPro  6.1  /  Alb  2.8<L>  /  TBili  0.3  /  DBili  x   /  AST  38  /  ALT  32  /  AlkPhos  62  11-19    PT/INR - ( 19 Nov 2019 05:32 )   PT: 31.4 sec;   INR: 2.69          PTT - ( 19 Nov 2019 05:32 )  PTT:35.5 sec        RADIOLOGY & ADDITIONAL TESTS:    MEDICATIONS  (STANDING):  insulin lispro (HumaLOG) corrective regimen sliding scale   SubCutaneous Before meals and at bedtime  pantoprazole    Tablet 40 milliGRAM(s) Oral before breakfast  polyethylene glycol 3350 17 Gram(s) Oral once  potassium chloride    Tablet ER 40 milliEquivalent(s) Oral every 2 hours  potassium chloride   Powder 40 milliEquivalent(s) Oral once  warfarin 1.5 milliGRAM(s) Oral once    MEDICATIONS  (PRN):  bisacodyl Suppository 10 milliGRAM(s) Rectal daily PRN Constipation

## 2019-11-20 ENCOUNTER — TRANSCRIPTION ENCOUNTER (OUTPATIENT)
Age: 79
End: 2019-11-20

## 2019-11-20 LAB
ALBUMIN SERPL ELPH-MCNC: 2.9 G/DL — LOW (ref 3.3–5)
ALP SERPL-CCNC: 58 U/L — SIGNIFICANT CHANGE UP (ref 40–120)
ALT FLD-CCNC: 30 U/L — SIGNIFICANT CHANGE UP (ref 10–45)
ANION GAP SERPL CALC-SCNC: 12 MMOL/L — SIGNIFICANT CHANGE UP (ref 5–17)
APTT BLD: 38.3 SEC — HIGH (ref 27.5–36.3)
AST SERPL-CCNC: 39 U/L — SIGNIFICANT CHANGE UP (ref 10–40)
BILIRUB SERPL-MCNC: 0.4 MG/DL — SIGNIFICANT CHANGE UP (ref 0.2–1.2)
BUN SERPL-MCNC: 9 MG/DL — SIGNIFICANT CHANGE UP (ref 7–23)
CALCIUM SERPL-MCNC: 8.6 MG/DL — SIGNIFICANT CHANGE UP (ref 8.4–10.5)
CHLORIDE SERPL-SCNC: 110 MMOL/L — HIGH (ref 96–108)
CO2 SERPL-SCNC: 19 MMOL/L — LOW (ref 22–31)
CREAT SERPL-MCNC: 1.75 MG/DL — HIGH (ref 0.5–1.3)
CRP SERPL-MCNC: 0.92 MG/DL — HIGH (ref 0–0.4)
ERYTHROCYTE [SEDIMENTATION RATE] IN BLOOD: 8 MM/HR — SIGNIFICANT CHANGE UP
GLUCOSE BLDC GLUCOMTR-MCNC: 119 MG/DL — HIGH (ref 70–99)
GLUCOSE BLDC GLUCOMTR-MCNC: 123 MG/DL — HIGH (ref 70–99)
GLUCOSE BLDC GLUCOMTR-MCNC: 178 MG/DL — HIGH (ref 70–99)
GLUCOSE BLDC GLUCOMTR-MCNC: 83 MG/DL — SIGNIFICANT CHANGE UP (ref 70–99)
GLUCOSE SERPL-MCNC: 131 MG/DL — HIGH (ref 70–99)
HCT VFR BLD CALC: 33.9 % — LOW (ref 34.5–45)
HGB BLD-MCNC: 11.3 G/DL — LOW (ref 11.5–15.5)
INR BLD: 2.7 — HIGH (ref 0.88–1.16)
MAGNESIUM SERPL-MCNC: 1.9 MG/DL — SIGNIFICANT CHANGE UP (ref 1.6–2.6)
MCHC RBC-ENTMCNC: 28.5 PG — SIGNIFICANT CHANGE UP (ref 27–34)
MCHC RBC-ENTMCNC: 33.3 GM/DL — SIGNIFICANT CHANGE UP (ref 32–36)
MCV RBC AUTO: 85.6 FL — SIGNIFICANT CHANGE UP (ref 80–100)
NRBC # BLD: 0 /100 WBCS — SIGNIFICANT CHANGE UP (ref 0–0)
PHOSPHATE SERPL-MCNC: 2.4 MG/DL — LOW (ref 2.5–4.5)
PLATELET # BLD AUTO: 243 K/UL — SIGNIFICANT CHANGE UP (ref 150–400)
POTASSIUM SERPL-MCNC: 3.6 MMOL/L — SIGNIFICANT CHANGE UP (ref 3.5–5.3)
POTASSIUM SERPL-SCNC: 3.6 MMOL/L — SIGNIFICANT CHANGE UP (ref 3.5–5.3)
PROT SERPL-MCNC: 5.8 G/DL — LOW (ref 6–8.3)
PROTHROM AB SERPL-ACNC: 31.5 SEC — HIGH (ref 10–12.9)
RBC # BLD: 3.96 M/UL — SIGNIFICANT CHANGE UP (ref 3.8–5.2)
RBC # FLD: 14.9 % — HIGH (ref 10.3–14.5)
SODIUM SERPL-SCNC: 141 MMOL/L — SIGNIFICANT CHANGE UP (ref 135–145)
SURGICAL PATHOLOGY STUDY: SIGNIFICANT CHANGE UP
WBC # BLD: 9.94 K/UL — SIGNIFICANT CHANGE UP (ref 3.8–10.5)
WBC # FLD AUTO: 9.94 K/UL — SIGNIFICANT CHANGE UP (ref 3.8–10.5)

## 2019-11-20 PROCEDURE — 99233 SBSQ HOSP IP/OBS HIGH 50: CPT | Mod: GC

## 2019-11-20 PROCEDURE — 99232 SBSQ HOSP IP/OBS MODERATE 35: CPT

## 2019-11-20 PROCEDURE — 74019 RADEX ABDOMEN 2 VIEWS: CPT | Mod: 26

## 2019-11-20 RX ORDER — MAGNESIUM SULFATE 500 MG/ML
1 VIAL (ML) INJECTION ONCE
Refills: 0 | Status: COMPLETED | OUTPATIENT
Start: 2019-11-20 | End: 2019-11-20

## 2019-11-20 RX ORDER — LACTULOSE 10 G/15ML
10 SOLUTION ORAL ONCE
Refills: 0 | Status: COMPLETED | OUTPATIENT
Start: 2019-11-20 | End: 2019-11-20

## 2019-11-20 RX ORDER — POTASSIUM PHOSPHATE, MONOBASIC POTASSIUM PHOSPHATE, DIBASIC 236; 224 MG/ML; MG/ML
15 INJECTION, SOLUTION INTRAVENOUS ONCE
Refills: 0 | Status: COMPLETED | OUTPATIENT
Start: 2019-11-20 | End: 2019-11-20

## 2019-11-20 RX ORDER — ACETAMINOPHEN 500 MG
650 TABLET ORAL ONCE
Refills: 0 | Status: COMPLETED | OUTPATIENT
Start: 2019-11-20 | End: 2019-11-20

## 2019-11-20 RX ORDER — WARFARIN SODIUM 2.5 MG/1
1.5 TABLET ORAL ONCE
Refills: 0 | Status: COMPLETED | OUTPATIENT
Start: 2019-11-20 | End: 2019-11-20

## 2019-11-20 RX ORDER — POTASSIUM CHLORIDE 20 MEQ
40 PACKET (EA) ORAL ONCE
Refills: 0 | Status: COMPLETED | OUTPATIENT
Start: 2019-11-20 | End: 2019-11-20

## 2019-11-20 RX ORDER — POLYETHYLENE GLYCOL 3350 17 G/17G
17 POWDER, FOR SOLUTION ORAL EVERY 12 HOURS
Refills: 0 | Status: DISCONTINUED | OUTPATIENT
Start: 2019-11-20 | End: 2019-11-21

## 2019-11-20 RX ORDER — ATORVASTATIN CALCIUM 80 MG/1
80 TABLET, FILM COATED ORAL AT BEDTIME
Refills: 0 | Status: DISCONTINUED | OUTPATIENT
Start: 2019-11-20 | End: 2019-11-21

## 2019-11-20 RX ORDER — SOD SULF/SODIUM/NAHCO3/KCL/PEG
2000 SOLUTION, RECONSTITUTED, ORAL ORAL ONCE
Refills: 0 | Status: COMPLETED | OUTPATIENT
Start: 2019-11-20 | End: 2019-11-20

## 2019-11-20 RX ADMIN — Medication 100 GRAM(S): at 09:35

## 2019-11-20 RX ADMIN — Medication 5 MILLIGRAM(S): at 22:15

## 2019-11-20 RX ADMIN — POTASSIUM PHOSPHATE, MONOBASIC POTASSIUM PHOSPHATE, DIBASIC 63.75 MILLIMOLE(S): 236; 224 INJECTION, SOLUTION INTRAVENOUS at 16:28

## 2019-11-20 RX ADMIN — PANTOPRAZOLE SODIUM 40 MILLIGRAM(S): 20 TABLET, DELAYED RELEASE ORAL at 07:03

## 2019-11-20 RX ADMIN — Medication 2000 MILLILITER(S): at 16:26

## 2019-11-20 RX ADMIN — LACTULOSE 10 GRAM(S): 10 SOLUTION ORAL at 15:03

## 2019-11-20 RX ADMIN — ATORVASTATIN CALCIUM 80 MILLIGRAM(S): 80 TABLET, FILM COATED ORAL at 22:15

## 2019-11-20 RX ADMIN — Medication 650 MILLIGRAM(S): at 17:43

## 2019-11-20 RX ADMIN — POLYETHYLENE GLYCOL 3350 17 GRAM(S): 17 POWDER, FOR SOLUTION ORAL at 09:36

## 2019-11-20 RX ADMIN — Medication 40 MILLIEQUIVALENT(S): at 09:35

## 2019-11-20 RX ADMIN — Medication 2: at 12:34

## 2019-11-20 RX ADMIN — POLYETHYLENE GLYCOL 3350 17 GRAM(S): 17 POWDER, FOR SOLUTION ORAL at 12:34

## 2019-11-20 RX ADMIN — Medication 650 MILLIGRAM(S): at 18:57

## 2019-11-20 RX ADMIN — WARFARIN SODIUM 1.5 MILLIGRAM(S): 2.5 TABLET ORAL at 22:16

## 2019-11-20 NOTE — PROGRESS NOTE ADULT - SUBJECTIVE AND OBJECTIVE BOX
GASTROENTEROLOGY PROGRESS NOTE  Patient seen and examined at bedside. Patient endorsing continued abdominal discomfort, again pointing to whole stomach. Patient remembers procedure yesterday, denies nausea, vomiting, abdominal distention. Still states she has not had a BM since she was hospitalized.     PERTINENT REVIEW OF SYSTEMS:  CONSTITUTIONAL: No weakness, fevers or chills  HEENT: No visual changes; No vertigo or throat pain   GASTROINTESTINAL: As above  NEUROLOGICAL: No numbness or weakness  SKIN: No itching, burning, rashes, or lesions     Allergies    codeine (Other)  Seafood (Other)  Sular (Other)    Intolerances      MEDICATIONS:  MEDICATIONS  (STANDING):  insulin lispro (HumaLOG) corrective regimen sliding scale   SubCutaneous Before meals and at bedtime  melatonin 5 milliGRAM(s) Oral at bedtime  pantoprazole    Tablet 40 milliGRAM(s) Oral before breakfast    MEDICATIONS  (PRN):  bisacodyl Suppository 10 milliGRAM(s) Rectal daily PRN Constipation    Vital Signs Last 24 Hrs  T(C): 36.7 (20 Nov 2019 05:48), Max: 36.8 (19 Nov 2019 20:54)  T(F): 98.1 (20 Nov 2019 05:48), Max: 98.3 (19 Nov 2019 20:54)  HR: 98 (20 Nov 2019 05:48) (95 - 98)  BP: 114/75 (20 Nov 2019 05:48) (101/67 - 114/75)  BP(mean): --  RR: 18 (20 Nov 2019 05:48) (18 - 19)  SpO2: 96% (20 Nov 2019 05:48) (96% - 99%)    11-19 @ 07:01  -  11-20 @ 07:00  --------------------------------------------------------  IN: 0 mL / OUT: 150 mL / NET: -150 mL      PHYSICAL EXAM:    General: Well developed; well nourished; in no acute distress  HEENT: MMM, conjunctiva and sclera clear  Gastrointestinal: Soft tender to palpation diffusely, non-distended, no rebound or guarding  Skin: Warm and dry. No obvious rash    LABS:                        11.3   9.94  )-----------( 243      ( 20 Nov 2019 06:09 )             33.9     11-20    141  |  110<H>  |  9   ----------------------------<  131<H>  3.6   |  19<L>  |  1.75<H>    Ca    8.6      20 Nov 2019 06:09  Phos  2.4     11-20  Mg     1.9     11-20    TPro  5.8<L>  /  Alb  2.9<L>  /  TBili  0.4  /  DBili  x   /  AST  39  /  ALT  30  /  AlkPhos  58  11-20    PT/INR - ( 20 Nov 2019 06:09 )   PT: 31.5 sec;   INR: 2.70          PTT - ( 20 Nov 2019 06:09 )  PTT:38.3 sec                  RADIOLOGY & ADDITIONAL STUDIES:  Reviewed

## 2019-11-20 NOTE — PROGRESS NOTE ADULT - SUBJECTIVE AND OBJECTIVE BOX
Physical Medicine and Rehabilitation Progress Note:    Patient is a 79y old  Female who presents with a chief complaint of hematochezia (20 Nov 2019 11:32)      HPI:  79YOF with PMH of HLD, Vascular Dementia ( A&Ox1), unspecified Cardiomyopathy, and GERD who presents for BRBPR. As per daughter on the phone, patient patient had been having constipation for a couple of days which led to straining with subsequent diarrhea w/ BRBPR which started as spotting and then to more sean bleeding (atleast 3 bouts). Pt endorsed to her daughter LLQ pain as well but denied any hemat(emesis), sick contacts, fever or chills at home, or prior episodes of BRBPR. Pt had C-scope years ago but daughter could not recall findings. In the ambulance, patient found to be altered and endorsing severe HA (worst headache of her life), therefore stroke code was called with negative imaging. GI consulted in ED s/p CTa/p with evidence of recto-sig thickening, recommending treatment for diverticulitis as well as workup for transaminitis. Pt seen at ED bedside at 9pm endorsing that HA has resolved, denies any blurry vision, new motor or sensation changes, and endorses no abd. pain at rest (just tenderness to palpation), and denies any nausea, (last BM had spotting of blood only once since admission).   At ED vitals:  afebrile, HR 110s, -140/70s, 90--. 95% 2L NC  Labs s/f: WBC 11, Bands 17, Lact 1.8, Hb 13.6, Bun/ Cr 25/2.64, AST//117    Pt given: CFTZ/flagyl, zofran 4 IVP, 2L NS (13 Nov 2019 22:36)                            11.3   9.94  )-----------( 243      ( 20 Nov 2019 06:09 )             33.9       11-20    141  |  110<H>  |  9   ----------------------------<  131<H>  3.6   |  19<L>  |  1.75<H>    Ca    8.6      20 Nov 2019 06:09  Phos  2.4     11-20  Mg     1.9     11-20    TPro  5.8<L>  /  Alb  2.9<L>  /  TBili  0.4  /  DBili  x   /  AST  39  /  ALT  30  /  AlkPhos  58  11-20    Vital Signs Last 24 Hrs  T(C): 36.6 (20 Nov 2019 12:09), Max: 36.8 (19 Nov 2019 20:54)  T(F): 97.9 (20 Nov 2019 12:09), Max: 98.3 (19 Nov 2019 20:54)  HR: 88 (20 Nov 2019 12:09) (88 - 98)  BP: 100/69 (20 Nov 2019 12:09) (100/69 - 114/75)  BP(mean): --  RR: 17 (20 Nov 2019 12:09) (17 - 19)  SpO2: 97% (20 Nov 2019 12:09) (96% - 99%)    MEDICATIONS  (STANDING):  atorvastatin 80 milliGRAM(s) Oral at bedtime  insulin lispro (HumaLOG) corrective regimen sliding scale   SubCutaneous Before meals and at bedtime  lactulose Syrup 10 Gram(s) Oral once  melatonin 5 milliGRAM(s) Oral at bedtime  pantoprazole    Tablet 40 milliGRAM(s) Oral before breakfast  polyethylene glycol 3350 17 Gram(s) Oral every 12 hours  potassium phosphate IVPB 15 milliMole(s) IV Intermittent once  warfarin 1.5 milliGRAM(s) Oral once    MEDICATIONS  (PRN):  bisacodyl Suppository 10 milliGRAM(s) Rectal daily PRN Constipation    Currently Undergoing Physical Therapy at bedside.    Functional Status Assessment:    Pain Assessment/Number Scale (0-10) Adult  Presence of Pain: complains of pain/discomfort   unrated  Body Location: constant headache since admission per pt, unchanged intensity with position changes. Generalized pain otherwise limiting therapy session    Therapeutic Interventions      Bed Mobility  Bed Mobility Training Rolling/Turning: stand-by assist;  1 person assist;  nonverbal cues (demo/gestures);  verbal cues;  set-up required;  bed rails;  VC's for turning body and reaching with UE to assist with rolling  Bed Mobility Training Scooting: stand-by assist;  1 person assist;  nonverbal cues (demo/gestures);  verbal cues;  set-up required;  bed rails;  VC's to place feet flat on ground  Bed Mobility Training Sit-to-Supine: minimum assist (75% patient effort);  1 person assist;  nonverbal cues (demo/gestures);  verbal cues;  set-up required;  bed rails;  HOB slightly elevated, assist required for trunk, VC's for UE use throughout  Bed Mobility Training Supine-to-Sit: minimum assist (75% patient effort);  1 person assist;  nonverbal cues (demo/gestures);  verbal cues;  set-up required;  bed rails;  assist for LE's, VC's for technique throughout  Bed Mobility Training Limitations: decreased ability to use arms for pushing/pulling;  decreased ability to use legs for bridging/pushing;  impaired ability to control trunk for mobility;  decreased strength;  impaired balance;  impaired postural control;  pain    Sit-Stand Transfer Training  Transfer Training Sit-to-Stand Transfer: minimum assist (75% patient effort);  2 person assist;  nonverbal cues (demo/gestures);  verbal cues;  set-up required;  VC's for UE use and technique throughout;  full weight-bearing   rolling walker  Transfer Training Stand-to-Sit Transfer: minimum assist (75% patient effort);  2 person assist;  nonverbal cues (demo/gestures);  verbal cues;  set-up required;  full weight-bearing   rolling walker  Sit-to-Stand Transfer Training Transfer Safety Analysis: decreased balance;  decreased cognition;  decreased strength;  impaired balance;  impaired postural control;  pain;  rolling walker    Gait Training  Gait Training: minimum assist (75% patient effort);  2 person assist;  nonverbal cues (demo/gestures);  verbal cues;  set-up required;  full weight-bearing   rolling walker;  1 side step EOB with max encouragement;  VC's for technique and management of RW  Gait Analysis: 3-point gait   decreased dion;  decreased hip/knee flexion;  decreased velocity of limb motion;  shuffling;  decreased step length;  pain;  impaired postural control;  impaired balance;  decreased strength;  1 side step EOB;  rolling walker    Therapeutic Exercise  Therapeutic Exercise Detail: Tolerated sitting EOB ~5-8 minutes, VC's for upright posture and breathing throughout to assist in managing dizziness syx upon sitting.Seated EOB ankle pumps - attempted, pt completed ~3-5 B, low effort. Sit to stand transfer minAx2 with RW - 2x         PM&R Impression: as above    Current Disposition Plan Recommendations: subacute rehab placement

## 2019-11-20 NOTE — PROGRESS NOTE ADULT - SUBJECTIVE AND OBJECTIVE BOX
SUBJECTIVE:  Patient seen and examined at bedside. She appears comfortable and in no acute distress. She still does not endorse having any bowel movements. She is still complaining of diffuse abdominal pain more pronounced on palpation. She says she is eating well. She denies any fevers, chills, chest pain, shortness of breath, nausea, vomiting, diarrhea, constipation.       Vital Signs Last 12 Hrs  T(F): 98.1 (11-20-19 @ 05:48), Max: 98.1 (11-20-19 @ 05:48)  HR: 98 (11-20-19 @ 05:48) (98 - 98)  BP: 114/75 (11-20-19 @ 05:48) (114/75 - 114/75)  BP(mean): --  RR: 18 (11-20-19 @ 05:48) (18 - 18)  SpO2: 96% (11-20-19 @ 05:48) (96% - 96%)  I&O's Summary    19 Nov 2019 07:01  -  20 Nov 2019 07:00  --------------------------------------------------------  IN: 0 mL / OUT: 150 mL / NET: -150 mL        PHYSICAL EXAM:  Constitutional: NAD, comfortable in bed.  HEENT: NC/AT, PERRLA, EOMI, no conjunctival pallor or scleral icterus, MMM  Neck: Supple, no JVD  Respiratory: CTA B/L. No w/r/r.   Cardiovascular: RRR, normal S1 and S2, no m/r/g.   Gastrointestinal: +BS, tender on palpation in all four quadrants, soft nondistended, no guarding or rebound tenderness, no palpable masses   Extremities: wwp; no cyanosis, clubbing or edema.   Vascular: Pulses equal and strong throughout.   Neurological: AAOx1, no CN deficits, strength and sensation intact throughout.   Skin: No gross skin abnormalities or rashes        LABS:                        11.3   9.94  )-----------( 243      ( 20 Nov 2019 06:09 )             33.9     11-20    141  |  110<H>  |  9   ----------------------------<  131<H>  3.6   |  19<L>  |  1.75<H>    Ca    8.6      20 Nov 2019 06:09  Phos  2.4     11-20  Mg     1.9     11-20    TPro  5.8<L>  /  Alb  2.9<L>  /  TBili  0.4  /  DBili  x   /  AST  39  /  ALT  30  /  AlkPhos  58  11-20    PT/INR - ( 20 Nov 2019 06:09 )   PT: 31.5 sec;   INR: 2.70          PTT - ( 20 Nov 2019 06:09 )  PTT:38.3 sec        RADIOLOGY & ADDITIONAL TESTS:    MEDICATIONS  (STANDING):  insulin lispro (HumaLOG) corrective regimen sliding scale   SubCutaneous Before meals and at bedtime  melatonin 5 milliGRAM(s) Oral at bedtime  pantoprazole    Tablet 40 milliGRAM(s) Oral before breakfast  polyethylene glycol 3350 17 Gram(s) Oral every 12 hours  warfarin 1.5 milliGRAM(s) Oral once    MEDICATIONS  (PRN):  bisacodyl Suppository 10 milliGRAM(s) Rectal daily PRN Constipation

## 2019-11-20 NOTE — DISCHARGE NOTE PROVIDER - CARE PROVIDER_API CALL
Mainor Hughes)  Internal Medicine  [address] [place] [id number]  Phone: [phone]  Fax: [phone]  Follow Up Time:     Janusz Waterman; MBBS)  Internal Medicine  [address] [place] [id number]  Phone: [phone]  Fax: [phone]  Follow Up Time:

## 2019-11-20 NOTE — DISCHARGE NOTE PROVIDER - NSDCCPCAREPLAN_GEN_ALL_CORE_FT
PRINCIPAL DISCHARGE DIAGNOSIS  Diagnosis: Colitis  Assessment and Plan of Treatment: You presented to the hospital with symptoms of abdominal pain and diarrhea with blood. On presentation you met criteria for a septic infection. A scan was done of your abdomen which showed inflammation in the large intestine most likely infectious in origin. We started you on IV antibiotics and continued them for 6 days. Gastroenterolgy was following you and performed a flexible sigmoidoscopy which evaluated your intestines. They found ulcerations in parts of your intestines. You did not have a bowel movement during your stay and subsequently, you were placed on a bowel regimen. On the last day of your stay you had two episodes of nonbloody watery diarrhea. Your abdominal pain got progressively better. You have remained hemodynamically stable and are being discharged with close follow up with gastroenterology   # Hematochezia  - Pt with BRBPR possibly due to likely colitis. Hb 12.0, stable. Pt found to have red discharge from her vagina. GYN attributed it to vaginal discharge not concerning for bleeding on external exam.   - hb stable today, 11.3  - Pt found to have red discharge from her vagina. GYN attributed it to vaginal discharge not concerning for bleeding on e  - TV U/S: Limited study due to abundant intra-pelvic gas and patient premature   termination of examination. No significant interval change from prior imaging.  - GYN not concerned for bleed, signed off  - Maintained 2 large bore IVs  - Maintained active Type and screen   - f/u GI/PCR if BM        SECONDARY DISCHARGE DIAGNOSES  Diagnosis: Rectal bleed  Assessment and Plan of Treatment:     Diagnosis: Diarrhea  Assessment and Plan of Treatment: PRINCIPAL DISCHARGE DIAGNOSIS  Diagnosis: Colitis  Assessment and Plan of Treatment: You presented to the hospital with symptoms of abdominal pain and diarrhea with blood. On presentation you met criteria for a septic infection. A scan was done of your abdomen which showed inflammation in the large intestine most likely infectious in origin. We started you on IV antibiotics and continued them for 6 days. Gastroenterolgy was following you and performed a flexible sigmoidoscopy which evaluated your intestines. They found ulcerations in parts of your intestines. You did not have a bowel movement during your stay and subsequently, you were placed on a bowel regimen. On the last day of your stay you had two episodes of nonbloody watery diarrhea. Your abdominal pain got progressively better. You have remained hemodynamically stable and there were no signs and symptoms of bleeding. You are being discharged with close follow up with gastroenterology with an appointmet on 11/25/2019.         SECONDARY DISCHARGE DIAGNOSES  Diagnosis: DVT, lower extremity  Assessment and Plan of Treatment: During this admission, you were noted to have a right swollen lower extremity. A scan was done which revealed a clot. This is dangerous because the clot can travel from your legs into your lungs, causing a sudden blockage of an artery and making difficult for you to breathe. In order to prevent this from happening, it is important that you take your blood thinner medication everyday and avoid laying/sitting for prolonged periods of time. Please see your primary care physician in 1-2 weeks. If you happen to experience sudden shortness of breath, chest pain, or a fast beating heart, please return to the emergency department for interval evaluation of your deep vein thrombosis (DVT). You need to coninue to take Warfarin 1.5mg as prescribed and follow up with INR checks for dosing adjustments.      Diagnosis: Transaminitis  Assessment and Plan of Treatment: On admission, you were noted to have elevated liver enzymes. Based on your history, this could've been due to your Tylenol use for chronic headaches. An abdominal ultrasound was done and shoed no acute abnormalities and no clots or strictures in the vasculature. During your stay, the liver enzymes have went down to normal and your abdominal pain got better from initial presentation PRINCIPAL DISCHARGE DIAGNOSIS  Diagnosis: Colitis  Assessment and Plan of Treatment: You presented to the hospital with symptoms of abdominal pain and diarrhea with blood. On presentation you met criteria for a septic infection. A scan was done of your abdomen which showed inflammation in the large intestine most likely infectious in origin. We started you on IV antibiotics and continued them for 6 days. Gastroenterolgy was following you and performed a flexible sigmoidoscopy which evaluated your intestines. They found ulcerations in parts of your intestines. You did not have a bowel movement during your stay and subsequently, you were placed on a bowel regimen. On the last day of your stay you had two episodes of nonbloody watery diarrhea. Your abdominal pain got progressively better. You have remained hemodynamically stable and there were no signs and symptoms of bleeding. You are being discharged with close follow up with gastroenterology with an appointmet on 11/25/2019.         SECONDARY DISCHARGE DIAGNOSES  Diagnosis: DVT, lower extremity  Assessment and Plan of Treatment: During this admission, you were noted to have a right swollen lower extremity. A scan was done which revealed a clot. This is dangerous because the clot can travel from your legs into your lungs, causing a sudden blockage of an artery and making difficult for you to breathe. In order to prevent this from happening, it is important that you take your blood thinner medication everyday and avoid laying/sitting for prolonged periods of time. Please see your primary care physician in 1-2 weeks. If you happen to experience sudden shortness of breath, chest pain, or a fast beating heart, please return to the emergency department for interval evaluation of your deep vein thrombosis (DVT). You need to coninue to take Warfarin 1.5mg as prescribed and follow up with INR checks for dosing adjustments. Your next INR check should be on Monday on 11/25/2019 with your primary care physician.      Diagnosis: Transaminitis  Assessment and Plan of Treatment: On admission, you were noted to have elevated liver enzymes. Based on your history, this could've been due to your Tylenol use for chronic headaches. An abdominal ultrasound was done and shoed no acute abnormalities and no clots or strictures in the vasculature. During your stay, the liver enzymes have went down to normal and your abdominal pain got better from initial presentation

## 2019-11-20 NOTE — DISCHARGE NOTE NURSING/CASE MANAGEMENT/SOCIAL WORK - PATIENT PORTAL LINK FT
You can access the FollowMyHealth Patient Portal offered by [facility] by registering at the following website: [email or web address]. By joining [facility]’s FollowMyHealth portal, you will also be able to view your health information using other applications (apps) compatible with our system.

## 2019-11-20 NOTE — PROGRESS NOTE ADULT - PROBLEM SELECTOR PLAN 9
F: None   E: Replete for K<4 Mag<2  N: DASH/TLC diet  A: None now in setting of GIB, SCDs  FULL CODE  Dispo:  RM[facility], discharge to [facility]

## 2019-11-20 NOTE — DISCHARGE NOTE PROVIDER - HOSPITAL COURSE
79YOF with PMH of HLD, Vascular Dementia (A&Ox1), unspecified Cardiomyopathy, and GERD who presents for BRBPR, admitted for hematochezia and colitis.        # Colitis    - On admission, pt with hematochezia and LLQ abd pain. Tachycardic 110s, WBC 11, Bands 17 (3/4 SIRS)    - CT A/P with recto-sigmoid thickness concerning for colitis. WBC today increased to 11.41 from 10.26     - s/p 5 days of ceftriaxone 1g daily and flagyl 500mg TID    - Pending stool studies, GI/PCR - no BM yet    - abdominal xray - no acute dilations or perforations    - flexible sigmoidoscopy on 11/19 with friable, circumferential area that is concerning for ischemia vs malignancy.    - Given lack of BM, started on aggressive bowel regimen with MiraLax QD, golytely, lactulose    . Flex sig - Colonic mucosa with ulceration, cryptitis and cryptabscess, no evidence of dysplasia        # Hematochezia    - Pt with BRBPR possibly due to likely colitis. Hb 12.0, stable. Pt found to have red discharge from her vagina. GYN attributed it to vaginal discharge not concerning for bleeding on external exam.     - hb stable today, 11.3    - Pt found to have red discharge from her vagina. GYN attributed it to vaginal discharge not concerning for bleeding on e    - TV U/S: Limited study due to abundant intra-pelvic gas and patient premature     termination of examination. No significant interval change from prior imaging.    - GYN not concerned for bleed, signed off    - Maintained 2 large bore IVs    - Maintained active Type and screen     - f/u GI/PCR if BM        # Transaminitis.    - Pt with AST//117 on admission with unknown baseline. Possibly 2/2 chronic Tylenol use (currently holding) vs NAFLD vs hepatitis (no cholestatic picture) vs 2/2 sepsis. Serum alpha-1-AT elevated. RUQ limited exam but negative for portal vein thrombosis.    - RUQ u/s - no acute abnormalities    - Transaminitis resolved        # asymmetrical RLE edema    - Endorses present for a while (no exact time) denies tenderness.     - RLE doppler revealed DVT in right femoral vein     - continued only on warfarin daily, dosed every night        # Vascular dementia without behavioral disturbance    - PMH of vascular dementia daughter unclear if CVA in the past.     - Baseline AAOx1 HCP daughter        # AMS/ Stroke R/o     - Pt found to be more altered and endorsing severe HA worst in her life s/p stroke code and CT imaging negative for an acute process. Patient likely altered secondary to baseline dementia vs sepsis. Today pt endorses mild headache.    - Per neuro if pt continues to have severe HA, consider LP to r/o SAH.         # Renal insufficiency.  Plan: Pt with Bun/ Cr 14/2.18 today, unknown baseline, likely multifactorial renal disease with pre-renal component in setting of diarrhea. S/p fluid repletion.     - SBP in low 100s s/p 500cc IVF    - F/u BMP daily    - Fena 1.8%, likely intrinsic.         # Cardiomyopathy    - PMH of cardiomyopathy as per daughter unknown etiology (does not recall if CAD) as she recently moved in with her.    - Holding ASA 325mg and follow up reasoning for this dose vs 81mg, and home Rosuvastatin 20qd holding in setting of transaminitis.        # HLD    - PMH of HLD on home Rosuvastatin 20qd holding in setting of transaminitis    - restarted today         # GERD     - home omeprazole 20qd c/w PPI 79YOF with PMH of HLD, Vascular Dementia (A&Ox1), unspecified Cardiomyopathy, and GERD who presents for BRBPR, admitted for hematochezia and colitis.        # Colitis    - On admission, pt with hematochezia and LLQ abd pain. Tachycardic 110s, WBC 11, Bands 17 (3/4 SIRS)    - CT A/P with recto-sigmoid thickness concerning for colitis. WBC today increased to 11.41 from 10.26     - s/p 5 days of ceftriaxone 1g daily and flagyl 500mg TID    - Pending stool studies, GI/PCR - no BM yet    - abdominal xray - no acute dilations or perforations    - flexible sigmoidoscopy on 11/19 with friable, circumferential area that is concerning for ischemia vs malignancy.    - Given lack of BM, started on aggressive bowel regimen with MiraLax QD, golytely, lactulose    . Flex sig - Colonic mucosa with ulceration, cryptitis and cryptabscess, no evidence of dysplasia        # Hematochezia    - Pt with BRBPR possibly due to likely colitis. Hb 12.0, stable. Pt found to have red discharge from her vagina. GYN attributed it to vaginal discharge not concerning for bleeding on external exam.     - hb stable today, 11.3    - Pt found to have red discharge from her vagina. GYN attributed it to vaginal discharge not concerning for bleeding on e    - TV U/S: Limited study due to abundant intra-pelvic gas and patient premature     termination of examination. No significant interval change from prior imaging.    - GYN not concerned for bleed, signed off    - Maintained 2 large bore IVs    - Maintained active Type and screen     - f/u GI/PCR if BM        # Transaminitis.    - Pt with AST//117 on admission with unknown baseline. Possibly 2/2 chronic Tylenol use (currently holding) vs NAFLD vs hepatitis (no cholestatic picture) vs 2/2 sepsis. Serum alpha-1-AT elevated. RUQ limited exam but negative for portal vein thrombosis.    - RUQ u/s - no acute abnormalities    - Transaminitis resolved        # asymmetrical RLE edema    - Endorses present for a while (no exact time) denies tenderness.     - RLE doppler revealed DVT in right femoral vein     - continued only on warfarin daily, dosed every night        # Vascular dementia without behavioral disturbance    - PMH of vascular dementia daughter unclear if CVA in the past.     - Baseline AAOx1 HCP daughter        # AMS/ Stroke R/o     - Pt found to be more altered and endorsing severe HA worst in her life s/p stroke code and CT imaging negative for an acute process. Patient likely altered secondary to baseline dementia vs sepsis. Today pt endorses mild headache    - Per neuro if pt continues to have severe HA, consider LP to r/o SAH.         # Renal insufficiency    - Pt with Bun/ Cr 14/2.18 on admission, unknown baseline, likely multifactorial renal disease with pre-renal component in setting of diarrhea. S/p fluid repletion.     - F/u BMP daily    - Fena 1.8%, likely intrinsic.         # Cardiomyopathy    - PMH of cardiomyopathy as per daughter unknown etiology (does not recall if CAD) as she recently moved in with her.    - Holding ASA 325mg and follow up reasoning for this dose vs 81mg, and home Rosuvastatin 20qd holding in setting of transaminitis.        # HLD    - PMH of HLD on home Rosuvastatin 20qd holding in setting of transaminitis    - restarted today         # GERD     - home omeprazole 20qd c/w PPI

## 2019-11-20 NOTE — DISCHARGE NOTE PROVIDER - CARE PROVIDERS DIRECT ADDRESSES
,[email or web address],[email or web address]

## 2019-11-20 NOTE — PROGRESS NOTE ADULT - ASSESSMENT
79YOF with PMH of HLD, Vascular Dementia (A&Ox1), unspecified Cardiomyopathy, and GERD who presents for BRBPR, admitted for hematochezia and colitis.

## 2019-11-20 NOTE — PROGRESS NOTE ADULT - PROBLEM SELECTOR PLAN 2
Pt with BRBPR possibly 2/2 likely colitis. Hb 12.0, stable. Pt found to have red discharge from her vagina. GYN attributed it to vaginal discharge not concerning for bleeding on external exam.   - hb stable today, 11.3  - TV U/S: Limited study due to abundant intra-pelvic gas and patient premature   termination of examination. No significant interval change from prior imaging.  - GYN not concerned for bleed, signed off  - Maintain 2 large bore IVs  - Maintain active Type and screen   - CBC daily  - Monitor BMs, none since admission  - f/u GI/PCR

## 2019-11-20 NOTE — PROGRESS NOTE ADULT - ASSESSMENT
per Internal Medicine    79YOF with PMH of HLD, Vascular Dementia (A&Ox1), unspecified Cardiomyopathy, and GERD who presents for BRBPR, admitted for hematochezia and colitis.    Problem/Plan - 1:  ·  Problem: Colitis.  Plan: On admission, pt with hematochezia and LLQ abd pain. Tachycardic 110s, WBC 11, Bands 17 (3/4 SIRS). CT A/P with recto-sigmoid thickness concerning for colitis. WBC today increased to 11.41 from 10.26   - f/u GI rec's  - s/p 5 days of ceftriaxone 1g daily and flagyl 500mg TID  - Pending stool studies, GI/PCR - no BM yet  - abdominal xray - no acute dilations or perforations  - started on bowel regimen  - flexible sigmoidoscopy on 11/19 with friable, circumferential area that is concerning for ischemia vs malignancy.  - Given lack of BM, would start aggressive bowel regimen with MiraLax QD or BID, Senna  - Can give GoLytely if needed for additional laxative support  - f/u pathology  - Patient may benefit from M[employer]gr[employer] if able to obtain as an inpatient.    Problem/Plan - 2:  ·  Problem: Hematochezia.  Plan: Pt with BRBPR possibly 2/2 likely colitis. Hb 12.0, stable. Pt found to have red discharge from her vagina. GYN attributed it to vaginal discharge not concerning for bleeding on external exam.   - hb stable today, 11.3  - TV U/S: Limited study due to abundant intra-pelvic gas and patient premature   termination of examination. No significant interval change from prior imaging.  - GYN not concerned for bleed, signed off  - Maintain 2 large bore IVs  - Maintain active Type and screen   - CBC daily  - Monitor BMs, none since admission  - f/u GI/PCR.     Problem/Plan - 3:  ·  Problem: Transaminitis.  Plan: Pt with AST//117 on admission with unknown baseline. Possibly 2/2 chronic Tylenol use (currently holding) vs NAFLD vs hepatitis (no cholestatic picture) vs 2/2 sepsis. Serum alpha-1-AT elevated. RUQ limited exam but negative for portal vein thrombosis.  - RUQ u/s - no acute abnormalities  - f/u CMP qd  - f/u KAJAL, ASMA, AMA, IgG    #asymmetrical RLE edema  Endorses present for a while (no exact time) denies tenderness.   - RLE doppler revealed DVT in right femoral vein   - will continue only on warfarin daily, 1.5mg tonight.     Problem/Plan - 4:  ·  Problem: Vascular dementia without behavioral disturbance.  Plan: PMH of vascular dementia daughter unclear if CVA in the past.   - Baseline AAOx1 HCP daughter  - F/u PT     #AMS/ Stroke R/o   Pt found to be more altered and endorsing severe HA worst in her life s/p stroke code and CT imaging negative for an acute process. Patient likely altered secondary to baseline dementia vs sepsis. Today pt endorses mild headache.  - Per neuro if pt continues to have severe HA, consider LP to r/o SAH.     Problem/Plan - 5:  ·  Problem: Renal insufficiency.  Plan: Pt with Bun/ Cr 14/2.18 today, unknown baseline, likely multifactorial renal disease with pre-renal component in setting of diarrhea. S/p fluid repletion.   - SBP in low 100s s/p 500cc IVF  - F/u BMP daily  - Fena 1.8%, likely intrinsic.     Problem/Plan - 6:  Problem: Cardiomyopathy. Plan: PMH of cardiomyopathy as per daughter unknown etiology (does not recall if CAD) as she recently moved in with her.  - Obtain collateral from family members   - Holding ASA 325mg and follow up reasoning for this dose vs 81mg, and home Rosuvastatin 20qd holding in setting of transaminitis.    Problem/Plan - 7:  ·  Problem: HLD (hyperlipidemia).  Plan: PMH of HLD on home Rosuvastatin 20qd holding in setting of transaminitis.     Problem/Plan - 8:  ·  Problem: GERD (gastroesophageal reflux disease).  Plan: PMH of GERD on omeprazole 20qd c/w PPI.     Problem/Plan - 9:  ·  Problem: Nutrition, metabolism, and development symptoms.  Plan: F: None   E: Replete for K<4 Mag<2  N: DASH/TLC diet  A: None now in setting of GIB, SCDs  FULL CODE

## 2019-11-20 NOTE — DISCHARGE NOTE PROVIDER - NSDCMRMEDTOKEN_GEN_ALL_CORE_FT
acetaminophen 325 mg oral tablet: 1 tab(s) orally once a day  aspirin 325 mg oral tablet: 1 tab(s) orally once a day  omeprazole 20 mg oral delayed release capsule: 1 cap(s) orally once a day  rosuvastatin 40 mg oral tablet: 1 tab(s) orally once a day  traMADol 100 mg/24 hours oral tablet, extended release: 1 tab(s) orally once a day acetaminophen 325 mg oral tablet: 1 tab(s) orally once a day  aspirin 325 mg oral tablet: 1 tab(s) orally once a day  Coumadin 1 mg oral tablet: 1.5 milligram(s) orally every 24 hours   omeprazole 20 mg oral delayed release capsule: 1 cap(s) orally once a day  rosuvastatin 40 mg oral tablet: 1 tab(s) orally once a day  traMADol 100 mg/24 hours oral tablet, extended release: 1 tab(s) orally once a day acetaminophen 325 mg oral tablet: 1 tab(s) orally once a day  aspirin 81 mg oral tablet: 1 tab(s) orally once a day x 30 days   Coumadin 1 mg oral tablet: 1.5 milligram(s) orally every 24 hours   omeprazole 20 mg oral delayed release capsule: 1 cap(s) orally once a day  rosuvastatin 40 mg oral tablet: 1 tab(s) orally once a day  traMADol 100 mg/24 hours oral tablet, extended release: 1 tab(s) orally once a day

## 2019-11-20 NOTE — PROGRESS NOTE ADULT - ASSESSMENT
78yo F with a PMHx of HLD, dementia (reportedly A&Ox1 at baseline), unspecified cardiomyopathy, and GERD who was sent in by per PMD after evaluation for bright red blood per rectum. GI consulted for rectal bleeding.    1. Rectal bleeding - no further evidence of rectal bleeding but now with vaginal bleeding- gyn following  Patient reportedly with multiple episodes of BRBPR noted in her diaper, no reported overt melena or hematochezia. Story of straining with passage of stool suggestive of hemorrhoidal bleeding, however, bandemia and tachycardia meeting SIRS criteria with LLQ pain and diarrhea concerning for diverticulitis/diverticular etiology of bleeding. Flexible sigmoidoscopy on 11/19 with friable, circumferential area that is concerning for ischemia vs malignancy.  - Given lack of BM, would start aggressive bowel regimen with MiraLax QD or BID, Senna  - Can give GoLytely if needed for additional laxative support  - f/u pathology  - Patient may benefit from [facility] if able to obtain as an inpatient    2. Elevated LTs - Resolving, likely element of rhabdomyolysis. Patient with elevated LTs on presentation, , ALT 70, normal bilirubin and alk phos suggestive of hepatocellular pattern. No reported history of liver disease, daughter denies EtOH history. Tylenol noted as home medication. No INR/PT prolongation noted on initial labs. Acetaminophen and Tylenol levels wnl.  - Continue to trend while hospitalized    Recommendations discussed with primary team  Case discussed with attending physician

## 2019-11-20 NOTE — DISCHARGE NOTE NURSING/CASE MANAGEMENT/SOCIAL WORK - NSDCPEPTCOWAR_GEN_ALL_CORE
Warfarin/Coumadin - Follow up monitoring/Warfarin/Coumadin - Compliance/Warfarin/Coumadin - Dietary Advice/Warfarin/Coumadin - Potential for adverse drug reactions and interactions

## 2019-11-20 NOTE — PROGRESS NOTE ADULT - PROBLEM SELECTOR PLAN 1
On admission, pt with hematochezia and LLQ abd pain. Tachycardic 110s, WBC 11, Bands 17 (3/4 SIRS). CT A/P with recto-sigmoid thickness concerning for colitis. WBC today increased to 11.41 from 10.26   - f/u GI rec's  - s/p 5 days of ceftriaxone 1g daily and flagyl 500mg TID  - Pending stool studies, GI/PCR - no BM yet  - abdominal xray - no acute dilations or perforations  - started on bowel regimen  - flex sig today - ulcerations noted  - GI recommended another abdominal xray and continuing miralax On admission, pt with hematochezia and LLQ abd pain. Tachycardic 110s, WBC 11, Bands 17 (3/4 SIRS). CT A/P with recto-sigmoid thickness concerning for colitis. WBC today increased to 11.41 from 10.26   - f/u GI rec's  - s/p 5 days of ceftriaxone 1g daily and flagyl 500mg TID  - Pending stool studies, GI/PCR - no BM yet  - abdominal xray - no acute dilations or perforations  - started on bowel regimen  - flexible sigmoidoscopy on 11/19 with friable, circumferential area that is concerning for ischemia vs malignancy.  - Given lack of BM, would start aggressive bowel regimen with MiraLax QD or BID, Senna  - Can give GoLytely if needed for additional laxative support  - f/u pathology  - Patient may benefit from M[employer] if able to obtain as an inpatient

## 2019-11-20 NOTE — DISCHARGE NOTE PROVIDER - NSDCFUADDAPPT_GEN_ALL_CORE_FT
You have an appointment with Dr. Hughes on Monday, 11/25/2019 at 11:45PM to check your INR level    Dr. Hughes will follow up with a home visit appointment on 11/27/2019 at 1PM

## 2019-11-21 VITALS
RESPIRATION RATE: 17 BRPM | SYSTOLIC BLOOD PRESSURE: 112 MMHG | DIASTOLIC BLOOD PRESSURE: 74 MMHG | OXYGEN SATURATION: 100 % | TEMPERATURE: 98 F | HEART RATE: 83 BPM

## 2019-11-21 LAB
ALBUMIN SERPL ELPH-MCNC: 2.8 G/DL — LOW (ref 3.3–5)
ALP SERPL-CCNC: 56 U/L — SIGNIFICANT CHANGE UP (ref 40–120)
ALT FLD-CCNC: 31 U/L — SIGNIFICANT CHANGE UP (ref 10–45)
ANION GAP SERPL CALC-SCNC: 13 MMOL/L — SIGNIFICANT CHANGE UP (ref 5–17)
APPEARANCE UR: CLEAR — SIGNIFICANT CHANGE UP
APTT BLD: 39.1 SEC — HIGH (ref 27.5–36.3)
AST SERPL-CCNC: 48 U/L — HIGH (ref 10–40)
BILIRUB SERPL-MCNC: 0.6 MG/DL — SIGNIFICANT CHANGE UP (ref 0.2–1.2)
BILIRUB UR-MCNC: NEGATIVE — SIGNIFICANT CHANGE UP
BUN SERPL-MCNC: 9 MG/DL — SIGNIFICANT CHANGE UP (ref 7–23)
CALCIUM SERPL-MCNC: 8.5 MG/DL — SIGNIFICANT CHANGE UP (ref 8.4–10.5)
CHLORIDE SERPL-SCNC: 106 MMOL/L — SIGNIFICANT CHANGE UP (ref 96–108)
CO2 SERPL-SCNC: 18 MMOL/L — LOW (ref 22–31)
COLOR SPEC: YELLOW — SIGNIFICANT CHANGE UP
CREAT SERPL-MCNC: 1.62 MG/DL — HIGH (ref 0.5–1.3)
DIFF PNL FLD: ABNORMAL
GLUCOSE BLDC GLUCOMTR-MCNC: 114 MG/DL — HIGH (ref 70–99)
GLUCOSE BLDC GLUCOMTR-MCNC: 121 MG/DL — HIGH (ref 70–99)
GLUCOSE BLDC GLUCOMTR-MCNC: 125 MG/DL — HIGH (ref 70–99)
GLUCOSE SERPL-MCNC: 132 MG/DL — HIGH (ref 70–99)
GLUCOSE UR QL: NEGATIVE — SIGNIFICANT CHANGE UP
HCT VFR BLD CALC: 33.6 % — LOW (ref 34.5–45)
HGB BLD-MCNC: 11.2 G/DL — LOW (ref 11.5–15.5)
INR BLD: 2.5 — HIGH (ref 0.88–1.16)
KETONES UR-MCNC: 15 MG/DL
LEUKOCYTE ESTERASE UR-ACNC: ABNORMAL
MAGNESIUM SERPL-MCNC: 1.8 MG/DL — SIGNIFICANT CHANGE UP (ref 1.6–2.6)
MCHC RBC-ENTMCNC: 28.7 PG — SIGNIFICANT CHANGE UP (ref 27–34)
MCHC RBC-ENTMCNC: 33.3 GM/DL — SIGNIFICANT CHANGE UP (ref 32–36)
MCV RBC AUTO: 86.2 FL — SIGNIFICANT CHANGE UP (ref 80–100)
NITRITE UR-MCNC: NEGATIVE — SIGNIFICANT CHANGE UP
NRBC # BLD: 0 /100 WBCS — SIGNIFICANT CHANGE UP (ref 0–0)
PH UR: 6 — SIGNIFICANT CHANGE UP (ref 5–8)
PHOSPHATE SERPL-MCNC: 2.7 MG/DL — SIGNIFICANT CHANGE UP (ref 2.5–4.5)
PLATELET # BLD AUTO: 244 K/UL — SIGNIFICANT CHANGE UP (ref 150–400)
POTASSIUM SERPL-MCNC: 3.2 MMOL/L — LOW (ref 3.5–5.3)
POTASSIUM SERPL-SCNC: 3.2 MMOL/L — LOW (ref 3.5–5.3)
PROT SERPL-MCNC: 5.8 G/DL — LOW (ref 6–8.3)
PROT UR-MCNC: ABNORMAL MG/DL
PROTHROM AB SERPL-ACNC: 29.1 SEC — HIGH (ref 10–12.9)
RBC # BLD: 3.9 M/UL — SIGNIFICANT CHANGE UP (ref 3.8–5.2)
RBC # FLD: 15.3 % — HIGH (ref 10.3–14.5)
SODIUM SERPL-SCNC: 137 MMOL/L — SIGNIFICANT CHANGE UP (ref 135–145)
SP GR SPEC: 1.02 — SIGNIFICANT CHANGE UP (ref 1–1.03)
UROBILINOGEN FLD QL: 0.2 E.U./DL — SIGNIFICANT CHANGE UP
WBC # BLD: 10.42 K/UL — SIGNIFICANT CHANGE UP (ref 3.8–10.5)
WBC # FLD AUTO: 10.42 K/UL — SIGNIFICANT CHANGE UP (ref 3.8–10.5)

## 2019-11-21 PROCEDURE — 87086 URINE CULTURE/COLONY COUNT: CPT

## 2019-11-21 PROCEDURE — 85652 RBC SED RATE AUTOMATED: CPT

## 2019-11-21 PROCEDURE — 85730 THROMBOPLASTIN TIME PARTIAL: CPT

## 2019-11-21 PROCEDURE — 83550 IRON BINDING TEST: CPT

## 2019-11-21 PROCEDURE — 80307 DRUG TEST PRSMV CHEM ANLYZR: CPT

## 2019-11-21 PROCEDURE — 93971 EXTREMITY STUDY: CPT

## 2019-11-21 PROCEDURE — 85027 COMPLETE CBC AUTOMATED: CPT

## 2019-11-21 PROCEDURE — 70498 CT ANGIOGRAPHY NECK: CPT

## 2019-11-21 PROCEDURE — 97530 THERAPEUTIC ACTIVITIES: CPT

## 2019-11-21 PROCEDURE — 70450 CT HEAD/BRAIN W/O DYE: CPT

## 2019-11-21 PROCEDURE — 93005 ELECTROCARDIOGRAM TRACING: CPT

## 2019-11-21 PROCEDURE — 86140 C-REACTIVE PROTEIN: CPT

## 2019-11-21 PROCEDURE — 82728 ASSAY OF FERRITIN: CPT

## 2019-11-21 PROCEDURE — 80061 LIPID PANEL: CPT

## 2019-11-21 PROCEDURE — 74176 CT ABD & PELVIS W/O CONTRAST: CPT

## 2019-11-21 PROCEDURE — 83036 HEMOGLOBIN GLYCOSYLATED A1C: CPT

## 2019-11-21 PROCEDURE — 96368 THER/DIAG CONCURRENT INF: CPT

## 2019-11-21 PROCEDURE — 86901 BLOOD TYPING SEROLOGIC RH(D): CPT

## 2019-11-21 PROCEDURE — 70496 CT ANGIOGRAPHY HEAD: CPT

## 2019-11-21 PROCEDURE — 83540 ASSAY OF IRON: CPT

## 2019-11-21 PROCEDURE — 86381 MITOCHONDRIAL ANTIBODY EACH: CPT

## 2019-11-21 PROCEDURE — 76856 US EXAM PELVIC COMPLETE: CPT

## 2019-11-21 PROCEDURE — 74019 RADEX ABDOMEN 2 VIEWS: CPT

## 2019-11-21 PROCEDURE — 82787 IGG 1 2 3 OR 4 EACH: CPT

## 2019-11-21 PROCEDURE — 88305 TISSUE EXAM BY PATHOLOGIST: CPT

## 2019-11-21 PROCEDURE — 99232 SBSQ HOSP IP/OBS MODERATE 35: CPT

## 2019-11-21 PROCEDURE — 86706 HEP B SURFACE ANTIBODY: CPT

## 2019-11-21 PROCEDURE — 86038 ANTINUCLEAR ANTIBODIES: CPT

## 2019-11-21 PROCEDURE — 99239 HOSP IP/OBS DSCHRG MGMT >30: CPT | Mod: GC

## 2019-11-21 PROCEDURE — 76705 ECHO EXAM OF ABDOMEN: CPT

## 2019-11-21 PROCEDURE — 83874 ASSAY OF MYOGLOBIN: CPT

## 2019-11-21 PROCEDURE — 76830 TRANSVAGINAL US NON-OB: CPT

## 2019-11-21 PROCEDURE — 99291 CRITICAL CARE FIRST HOUR: CPT | Mod: 25

## 2019-11-21 PROCEDURE — 96366 THER/PROPH/DIAG IV INF ADDON: CPT

## 2019-11-21 PROCEDURE — 96365 THER/PROPH/DIAG IV INF INIT: CPT | Mod: XU

## 2019-11-21 PROCEDURE — 86255 FLUORESCENT ANTIBODY SCREEN: CPT

## 2019-11-21 PROCEDURE — 82390 ASSAY OF CERULOPLASMIN: CPT

## 2019-11-21 PROCEDURE — 36415 COLL VENOUS BLD VENIPUNCTURE: CPT

## 2019-11-21 PROCEDURE — 82010 KETONE BODYS QUAN: CPT

## 2019-11-21 PROCEDURE — 82550 ASSAY OF CK (CPK): CPT

## 2019-11-21 PROCEDURE — 86850 RBC ANTIBODY SCREEN: CPT

## 2019-11-21 PROCEDURE — 0042T: CPT

## 2019-11-21 PROCEDURE — 83735 ASSAY OF MAGNESIUM: CPT

## 2019-11-21 PROCEDURE — 82570 ASSAY OF URINE CREATININE: CPT

## 2019-11-21 PROCEDURE — 71045 X-RAY EXAM CHEST 1 VIEW: CPT

## 2019-11-21 PROCEDURE — 83605 ASSAY OF LACTIC ACID: CPT

## 2019-11-21 PROCEDURE — 80074 ACUTE HEPATITIS PANEL: CPT

## 2019-11-21 PROCEDURE — 81001 URINALYSIS AUTO W/SCOPE: CPT

## 2019-11-21 PROCEDURE — 86900 BLOOD TYPING SEROLOGIC ABO: CPT

## 2019-11-21 PROCEDURE — 84300 ASSAY OF URINE SODIUM: CPT

## 2019-11-21 PROCEDURE — 81332 SERPINA1 GENE: CPT

## 2019-11-21 PROCEDURE — 80048 BASIC METABOLIC PNL TOTAL CA: CPT

## 2019-11-21 PROCEDURE — 84466 ASSAY OF TRANSFERRIN: CPT

## 2019-11-21 PROCEDURE — 82962 GLUCOSE BLOOD TEST: CPT

## 2019-11-21 PROCEDURE — 96375 TX/PRO/DX INJ NEW DRUG ADDON: CPT | Mod: XU

## 2019-11-21 PROCEDURE — 84100 ASSAY OF PHOSPHORUS: CPT

## 2019-11-21 PROCEDURE — 85025 COMPLETE CBC W/AUTO DIFF WBC: CPT

## 2019-11-21 PROCEDURE — 97161 PT EVAL LOW COMPLEX 20 MIN: CPT

## 2019-11-21 PROCEDURE — 80053 COMPREHEN METABOLIC PANEL: CPT

## 2019-11-21 PROCEDURE — 85610 PROTHROMBIN TIME: CPT

## 2019-11-21 RX ORDER — POTASSIUM CHLORIDE 20 MEQ
40 PACKET (EA) ORAL EVERY 4 HOURS
Refills: 0 | Status: DISCONTINUED | OUTPATIENT
Start: 2019-11-21 | End: 2019-11-21

## 2019-11-21 RX ORDER — ASPIRIN/CALCIUM CARB/MAGNESIUM 324 MG
1 TABLET ORAL
Qty: 30 | Refills: 0
Start: 2019-11-21 | End: 2019-12-20

## 2019-11-21 RX ORDER — ASPIRIN/CALCIUM CARB/MAGNESIUM 324 MG
1 TABLET ORAL
Qty: 0 | Refills: 0 | DISCHARGE

## 2019-11-21 RX ORDER — WARFARIN SODIUM 2.5 MG/1
1.5 TABLET ORAL ONCE
Refills: 0 | Status: DISCONTINUED | OUTPATIENT
Start: 2019-11-21 | End: 2019-11-21

## 2019-11-21 RX ORDER — MAGNESIUM SULFATE 500 MG/ML
2 VIAL (ML) INJECTION ONCE
Refills: 0 | Status: COMPLETED | OUTPATIENT
Start: 2019-11-21 | End: 2019-11-21

## 2019-11-21 RX ORDER — WARFARIN SODIUM 2.5 MG/1
1.5 TABLET ORAL
Qty: 45 | Refills: 0
Start: 2019-11-21 | End: 2019-12-20

## 2019-11-21 RX ORDER — POTASSIUM CHLORIDE 20 MEQ
40 PACKET (EA) ORAL
Refills: 0 | Status: COMPLETED | OUTPATIENT
Start: 2019-11-21 | End: 2019-11-21

## 2019-11-21 RX ORDER — WARFARIN SODIUM 2.5 MG/1
1.5 TABLET ORAL
Qty: 30 | Refills: 0
Start: 2019-11-21 | End: 2019-12-20

## 2019-11-21 RX ADMIN — POLYETHYLENE GLYCOL 3350 17 GRAM(S): 17 POWDER, FOR SOLUTION ORAL at 00:56

## 2019-11-21 RX ADMIN — Medication 40 MILLIEQUIVALENT(S): at 09:55

## 2019-11-21 RX ADMIN — PANTOPRAZOLE SODIUM 40 MILLIGRAM(S): 20 TABLET, DELAYED RELEASE ORAL at 05:38

## 2019-11-21 RX ADMIN — POLYETHYLENE GLYCOL 3350 17 GRAM(S): 17 POWDER, FOR SOLUTION ORAL at 11:13

## 2019-11-21 RX ADMIN — Medication 40 MILLIEQUIVALENT(S): at 10:48

## 2019-11-21 RX ADMIN — Medication 40 MILLIEQUIVALENT(S): at 11:13

## 2019-11-21 RX ADMIN — Medication 50 GRAM(S): at 09:55

## 2019-11-21 NOTE — PROGRESS NOTE ADULT - ASSESSMENT
78yo F with a PMHx of HLD, dementia (reportedly A&Ox1 at baseline), unspecified cardiomyopathy, and GERD who was sent in by per PMD after evaluation for bright red blood per rectum. GI consulted for rectal bleeding.    1. Rectal bleeding - no further evidence of rectal bleeding but now with vaginal bleeding- gyn following  Patient reportedly with multiple episodes of BRBPR noted in her diaper, no reported overt melena or hematochezia. Story of straining with passage of stool suggestive of hemorrhoidal bleeding, however, bandemia and tachycardia meeting SIRS criteria with LLQ pain and diarrhea concerning for diverticulitis/diverticular etiology of bleeding. Flexible sigmoidoscopy on 11/19 with friable, circumferential area that is concerning for ischemia vs malignancy.  - Pathology with fibropurulent necrosis likely secondary to localized ischemia in the setting of stercoral colitis  - Patient with BM and resolution of abdominal pain after GoLytely administration   - Would continue aggressive bowel regimen at home with MiraLax BID, Senna, and suppositories PRN  - Encourage adequate hydration and high-fiber diet    2. Elevated LTs - Resolving, likely element of rhabdomyolysis. Patient with elevated LTs on presentation, , ALT 70, normal bilirubin and alk phos suggestive of hepatocellular pattern. No reported history of liver disease, daughter denies EtOH history. Tylenol noted as home medication. No INR/PT prolongation noted on initial labs. Acetaminophen and Tylenol levels wnl.  - Continue to trend while hospitalized  - Ensure adequate hydration    Recommendations discussed with primary team  Case discussed with attending physician  Please recall as needed with any gastroenterological questions  Please schedule appointment for patient to follow-up with Dr. Waterman after their discharge at [phone]

## 2019-11-21 NOTE — PROGRESS NOTE ADULT - REASON FOR ADMISSION
hematochezia

## 2019-11-21 NOTE — PROGRESS NOTE ADULT - SUBJECTIVE AND OBJECTIVE BOX
GASTROENTEROLOGY PROGRESS NOTE  Patient seen and examined at bedside. Patient with large BM overnight with resolution of abdominal pain after administration of GoLytely. No nausea or vomiting, no melena or hematochezia. Patient otherwise feels well.     PERTINENT REVIEW OF SYSTEMS:  CONSTITUTIONAL: No weakness, fevers or chills  HEENT: No visual changes; No vertigo or throat pain   GASTROINTESTINAL: As above  NEUROLOGICAL: No numbness or weakness  SKIN: No itching, burning, rashes, or lesions     Allergies    codeine (Other)  Seafood (Other)  Sular (Other)    Intolerances      MEDICATIONS:  MEDICATIONS  (STANDING):  atorvastatin 80 milliGRAM(s) Oral at bedtime  insulin lispro (HumaLOG) corrective regimen sliding scale   SubCutaneous Before meals and at bedtime  melatonin 5 milliGRAM(s) Oral at bedtime  pantoprazole    Tablet 40 milliGRAM(s) Oral before breakfast  polyethylene glycol 3350 17 Gram(s) Oral every 12 hours  potassium chloride    Tablet ER 40 milliEquivalent(s) Oral every 4 hours  warfarin 1.5 milliGRAM(s) Oral once    MEDICATIONS  (PRN):  bisacodyl Suppository 10 milliGRAM(s) Rectal daily PRN Constipation    Vital Signs Last 24 Hrs  T(C): 36.6 (21 Nov 2019 05:23), Max: 36.8 (20 Nov 2019 21:18)  T(F): 97.8 (21 Nov 2019 05:23), Max: 98.3 (20 Nov 2019 21:18)  HR: 94 (21 Nov 2019 05:23) (88 - 95)  BP: 110/69 (21 Nov 2019 05:23) (100/69 - 110/69)  BP(mean): --  RR: 18 (21 Nov 2019 05:23) (17 - 18)  SpO2: 97% (21 Nov 2019 05:23) (96% - 97%)    PHYSICAL EXAM:    General: Well developed; well nourished; in no acute distress  HEENT: MMM, conjunctiva and sclera clear  Gastrointestinal: Soft non-tender non-distended; Normal bowel sounds; No hepatosplenomegaly. No rebound or guarding  Skin: Warm and dry. No obvious rash    LABS:                        11.2   10.42 )-----------( 244      ( 21 Nov 2019 06:33 )             33.6     11-21    137  |  106  |  9   ----------------------------<  132<H>  3.2<L>   |  18<L>  |  1.62<H>    Ca    8.5      21 Nov 2019 06:33  Phos  2.7     11-21  Mg     1.8     11-21    TPro  5.8<L>  /  Alb  2.8<L>  /  TBili  0.6  /  DBili  x   /  AST  48<H>  /  ALT  31  /  AlkPhos  56  11-21    PT/INR - ( 21 Nov 2019 06:33 )   PT: 29.1 sec;   INR: 2.50          PTT - ( 21 Nov 2019 06:33 )  PTT:39.1 sec                  RADIOLOGY & ADDITIONAL STUDIES:  Reviewed

## 2019-11-23 ENCOUNTER — MOBILE ON CALL (OUTPATIENT)
Age: 79
End: 2019-11-23

## 2019-11-25 ENCOUNTER — APPOINTMENT (OUTPATIENT)
Dept: GASTROENTEROLOGY | Facility: CLINIC | Age: 79
End: 2019-11-25

## 2019-12-02 DIAGNOSIS — R73.9 HYPERGLYCEMIA, UNSPECIFIED: ICD-10-CM

## 2019-12-02 DIAGNOSIS — E87.2 ACIDOSIS: ICD-10-CM

## 2019-12-02 DIAGNOSIS — R74.0 NONSPECIFIC ELEVATION OF LEVELS OF TRANSAMINASE AND LACTIC ACID DEHYDROGENASE [LDH]: ICD-10-CM

## 2019-12-02 DIAGNOSIS — R79.89 OTHER SPECIFIED ABNORMAL FINDINGS OF BLOOD CHEMISTRY: ICD-10-CM

## 2019-12-02 DIAGNOSIS — N17.9 ACUTE KIDNEY FAILURE, UNSPECIFIED: ICD-10-CM

## 2019-12-02 DIAGNOSIS — K62.5 HEMORRHAGE OF ANUS AND RECTUM: ICD-10-CM

## 2019-12-02 DIAGNOSIS — Z88.2 ALLERGY STATUS TO SULFONAMIDES: ICD-10-CM

## 2019-12-02 DIAGNOSIS — G93.41 METABOLIC ENCEPHALOPATHY: ICD-10-CM

## 2019-12-02 DIAGNOSIS — Z86.718 PERSONAL HISTORY OF OTHER VENOUS THROMBOSIS AND EMBOLISM: ICD-10-CM

## 2019-12-02 DIAGNOSIS — E78.5 HYPERLIPIDEMIA, UNSPECIFIED: ICD-10-CM

## 2019-12-02 DIAGNOSIS — F01.50 VASCULAR DEMENTIA WITHOUT BEHAVIORAL DISTURBANCE: ICD-10-CM

## 2019-12-02 DIAGNOSIS — R10.9 UNSPECIFIED ABDOMINAL PAIN: ICD-10-CM

## 2019-12-02 DIAGNOSIS — Z79.01 LONG TERM (CURRENT) USE OF ANTICOAGULANTS: ICD-10-CM

## 2019-12-02 DIAGNOSIS — I82.401 ACUTE EMBOLISM AND THROMBOSIS OF UNSPECIFIED DEEP VEINS OF RIGHT LOWER EXTREMITY: ICD-10-CM

## 2019-12-02 DIAGNOSIS — R94.5 ABNORMAL RESULTS OF LIVER FUNCTION STUDIES: ICD-10-CM

## 2019-12-02 DIAGNOSIS — Z91.013 ALLERGY TO SEAFOOD: ICD-10-CM

## 2019-12-02 DIAGNOSIS — Z90.710 ACQUIRED ABSENCE OF BOTH CERVIX AND UTERUS: ICD-10-CM

## 2019-12-02 DIAGNOSIS — K57.92 DIVERTICULITIS OF INTESTINE, PART UNSPECIFIED, WITHOUT PERFORATION OR ABSCESS WITHOUT BLEEDING: ICD-10-CM

## 2019-12-02 DIAGNOSIS — K52.9 NONINFECTIVE GASTROENTERITIS AND COLITIS, UNSPECIFIED: ICD-10-CM

## 2019-12-02 DIAGNOSIS — A41.9 SEPSIS, UNSPECIFIED ORGANISM: ICD-10-CM

## 2019-12-02 DIAGNOSIS — Z79.82 LONG TERM (CURRENT) USE OF ASPIRIN: ICD-10-CM

## 2019-12-02 DIAGNOSIS — Z88.5 ALLERGY STATUS TO NARCOTIC AGENT: ICD-10-CM

## 2019-12-02 DIAGNOSIS — I42.9 CARDIOMYOPATHY, UNSPECIFIED: ICD-10-CM

## 2019-12-02 DIAGNOSIS — K21.9 GASTRO-ESOPHAGEAL REFLUX DISEASE WITHOUT ESOPHAGITIS: ICD-10-CM

## 2019-12-02 DIAGNOSIS — E87.6 HYPOKALEMIA: ICD-10-CM

## 2019-12-09 NOTE — PROGRESS NOTE ADULT - PROBLEM SELECTOR PLAN 2
Pt with BRBPR possibly 2/2 likely colitis. Hb 12.0, stable. Pt found to have red discharge from her vagina. GYN attributed it to vaginal discharge not concerning for bleeding on external exam.   - f/u transvaginal ultrasound and further rec's from Ob/Gyn  - Maintain 2 large bore IVs  - Maintain active Type and screen   - CBC daily  - Monitor BMs, none since admission  - f/u GI/PCR none

## 2022-02-22 NOTE — DISCHARGE NOTE NURSING/CASE MANAGEMENT/SOCIAL WORK - NSPROEXTENSIONSOFSELF_GEN_A_NUR
none Griseofulvin Counseling:  I discussed with the patient the risks of griseofulvin including but not limited to photosensitivity, cytopenia, liver damage, nausea/vomiting and severe allergy.  The patient understands that this medication is best absorbed when taken with a fatty meal (e.g., ice cream or french fries).

## 2022-10-08 NOTE — PATIENT PROFILE ADULT - NSPROGENSOURCEINFO_GEN_A_NUR
patient Otolaryngologist Procedure Text (B): After obtaining clear surgical margins the patient was sent to otolaryngology for surgical repair.  The patient understands they will receive post-surgical care and follow-up from the referring physician's office. 5

## 2022-11-21 NOTE — PROGRESS NOTE ADULT - PROBLEM SELECTOR PLAN 9
Provider e-prescribed prescription to the pharmacy.    F: None   E: Replete for K<4 Mag<2  N: DASH/TLC diet  A: None now in setting of GIB, SCDs  FULL CODE  Dispo:  [facility] PMH of GERD on omeprazole 20qd c/w PPI

## 2024-01-30 NOTE — PROGRESS NOTE ADULT - PROBLEM SELECTOR PLAN 2
Detail Level: Simple Detail Level: Generalized Detail Level: Zone Sunscreen Recommendations: Patient was encouraged to wear broad spectrum SPF 30 or higher sunscreen to sun exposed skin Pt with hematochezia and LLQ abd pain, found to have CT a/p recto-sigmoid thickness concerning for possible diverticulitis. Lactate neg but WBC 11, Bands 17. S/p GI consult with recs for tx of diverticulitis.  - C/w CFTZ 1gqd /flagyl 500mg TID   - F/u GI recs     #Sepsis  Pt tachycardic 110s, WBC 11, Bands 17 3/4 SIRS. Appropriate fluid resuscitation 2L NS likely all 2/2 intrabd. source as above.   - F/u BCX ,UA/UCX, CXR no infiltrate   - Abx as above Pt with AST//117 on admission with unknown baseline. Possibly 2/2 chronic tylenol use (currently holding) vs NAFLD, vs  hepatitis (no cholestatic picture) vs 2/2 sepsis.   - hepatitis panel   - salicylate/ acet level nL   - f/u CMP qd  - F/u RUQ u/s     #asymmetrical RLE edema  Endorses present for a while (no exact time) denies tenderness.   - F/u RLE doppler for r/o dvt On admission, pt tachycardic 110s, WBC 11, Bands 17 3/4 SIRS. Appropriate fluid resuscitation 2L  Pt with hematochezia and LLQ abd pain, found to have CT a/p recto-sigmoid thickness concerning for colitis. Lactate neg but WBC 11, Bands 17, resolved today   - f/u GI recs  - C/w CFTZ 1gqd /flagyl 500mg TID Pt with BRBPR possibly 2/2 likely colitis. Hb stable.   - This AM, she was examined to have 2 episodes of blood/mucus clots from her vagina. A fistula can be a likely source of the bleeding. Transvaginal ultrasound ordered and OB service consulted for recs,  - Maintain active Type and screen   - CBCqd  - Monitor BMs   - Maintain 2 large bore IVs  - f/u GI/PCR

## 2024-05-13 NOTE — DIETITIAN INITIAL EVALUATION ADULT. - SIGNS/SYMPTOMS
[facility] Suicide Severity Rating Scale  1. Have you wished you were dead or wished you could go to sleep and not wake up? (past month): No (05/13/24 1335)  2. Have you actually had any thoughts of killing yourself? (past month): No (05/13/24 1335)  6. Have you ever done anything, started to do anything, or prepared to do anything to end your life? (lifetime): No (05/13/24 1335)  Suicide Evaluation: Negative Screen - White (05/13/24 1335)    Recent PHQ 2/9 Score    PHQ 2:  PHQ 2 Score Adult PHQ 2 Score Adult PHQ 2 Interpretation Little interest or pleasure in activity?   5/13/2024   1:35 PM 0 No further screening needed 0       PHQ 9:     PHQ-2/9 Depression Screening  Little interest or pleasure in activity?: Not at all  Feeling down, depressed or hopeless?: Not at all  Initial depression screening score:: 0  PHQ2 Interpretation: No further screening needed         Health Maintenance       DTaP/Tdap/Td Vaccine (1 - Tdap)  Overdue since 4/17/2010    Shingles Vaccine (2 of 3)  Overdue since 3/4/2015    COVID-19 Vaccine (2 - 2023-24 season)  Overdue since 9/1/2023    Medicare Advantage- Medicare Wellness Visit (Yearly - January to December)  Overdue since 1/1/2024           Following review of the above:  Patient is not proceeding with: COVID-19, Dtap/Tdap/Td, and Shingles  MWV scheduled for August 2024.     Note: Refer to final orders and clinician documentation.        AEB: less than 50% intake
